# Patient Record
Sex: FEMALE | Race: WHITE | NOT HISPANIC OR LATINO | Employment: FULL TIME | URBAN - METROPOLITAN AREA
[De-identification: names, ages, dates, MRNs, and addresses within clinical notes are randomized per-mention and may not be internally consistent; named-entity substitution may affect disease eponyms.]

---

## 2017-01-07 ENCOUNTER — HOSPITAL ENCOUNTER (OUTPATIENT)
Dept: SLEEP MEDICINE | Facility: HOSPITAL | Age: 27
Discharge: HOME | End: 2017-01-07
Attending: PSYCHIATRY & NEUROLOGY | Admitting: PSYCHIATRY & NEUROLOGY

## 2017-03-31 ENCOUNTER — HOSPITAL ENCOUNTER (OUTPATIENT)
Dept: LAB | Facility: HOSPITAL | Age: 27
Discharge: HOME | End: 2017-03-31
Attending: PSYCHIATRY & NEUROLOGY | Admitting: PSYCHIATRY & NEUROLOGY

## 2017-03-31 LAB
ALBUMIN SERPL-MCNC: 3.7 G/DL (ref 3.5–4.8)
ALBUMIN/GLOB SERPL: 1.1 {RATIO} (ref 1–1.7)
ALP SERPL-CCNC: 82 IU/L (ref 32–91)
ALT SERPL-CCNC: 20 IU/L (ref 14–54)
ANION GAP SERPL CALC-SCNC: 12.6 MMOL/L (ref 10–20)
AST SERPL-CCNC: 19 IU/L (ref 15–41)
BASOPHILS # BLD AUTO: 0 10*3/UL (ref 0–0.2)
BASOPHILS NFR BLD AUTO: 1 % (ref 0–2)
BILIRUB SERPL-MCNC: 0.7 MG/DL (ref 0.3–1.2)
BILIRUB UR QL STRIP: NEGATIVE MG/DL
BUN SERPL-MCNC: 13 MG/DL (ref 8–20)
BUN/CREAT SERPL: 16.3 (ref 5.4–26.2)
CALCIUM SERPL-MCNC: 9.1 MG/DL (ref 8.9–10.3)
CASTS URNS QL MICRO: ABNORMAL /[LPF]
CHLORIDE SERPL-SCNC: 110 MMOL/L (ref 101–111)
CHOLEST SERPL-MCNC: 236 MG/DL
CHOLEST/HDLC SERPL: 6.5 {RATIO}
COLOR UR: ABNORMAL
CONV BACTERIA IN URINE MICRO: NEGATIVE
CONV CLARITY OF URINE: ABNORMAL
CONV CO2: 20 MMOL/L (ref 22–32)
CONV HYALINE CASTS IN URINE MICRO: 4 /[LPF] (ref 0–5)
CONV LDL CHOLESTEROL DIRECT: 180 MG/DL (ref 0–100)
CONV PROTEIN IN URINE BY AUTOMATED TEST STRIP: NEGATIVE MG/DL
CONV SMALL ROUND CELLS: ABNORMAL /[HPF]
CONV TOTAL PROTEIN: 7 G/DL (ref 6.1–7.9)
CONV UROBILINOGEN IN URINE BY AUTOMATED TEST STRIP: 0.2 MG/DL
CREAT UR-MCNC: 0.8 MG/DL (ref 0.4–1)
CULTURE INDICATED?: ABNORMAL
DIFFERENTIAL METHOD BLD: (no result)
EOSINOPHIL # BLD AUTO: 0.1 10*3/UL (ref 0–0.3)
EOSINOPHIL # BLD AUTO: 2 % (ref 0–3)
ERYTHROCYTE [DISTWIDTH] IN BLOOD BY AUTOMATED COUNT: 14.4 % (ref 11.5–14.5)
FOLATE SERPL-MCNC: 11 NG/ML (ref 5.9–24.8)
GLOBULIN UR ELPH-MCNC: 3.3 G/DL (ref 2.5–3.8)
GLUCOSE SERPL-MCNC: 110 MG/DL (ref 65–99)
GLUCOSE UR QL: NEGATIVE MG/DL
HCT VFR BLD AUTO: 36.6 % (ref 35–49)
HDLC SERPL-MCNC: 36 MG/DL
HGB BLD-MCNC: 12.1 G/DL (ref 12–15)
HGB UR QL STRIP: NEGATIVE
KETONES UR QL STRIP: NEGATIVE MG/DL
LDLC/HDLC SERPL: 4.9 {RATIO}
LEUKOCYTE ESTERASE UR QL STRIP: NEGATIVE
LIPID INTERPRETATION: ABNORMAL
LYMPHOCYTES # BLD AUTO: 1.5 10*3/UL (ref 0.8–4.8)
LYMPHOCYTES NFR BLD AUTO: 26 % (ref 18–42)
MCH RBC QN AUTO: 27.3 PG (ref 26–32)
MCHC RBC AUTO-ENTMCNC: 33.2 G/DL (ref 32–36)
MCV RBC AUTO: 82.2 FL (ref 80–94)
MONOCYTES # BLD AUTO: 0.4 10*3/UL (ref 0.1–1.3)
MONOCYTES NFR BLD AUTO: 6 % (ref 2–11)
NEUTROPHILS # BLD AUTO: 4 10*3/UL (ref 2.3–8.6)
NEUTROPHILS NFR BLD AUTO: 65 % (ref 50–75)
NITRITE UR QL STRIP: NEGATIVE
NRBC BLD AUTO-RTO: 0 /100{WBCS}
NRBC/RBC NFR BLD MANUAL: 0 10*3/UL
PH UR STRIP.AUTO: 5.5 [PH] (ref 4.5–8)
PLATELET # BLD AUTO: 316 10*3/UL (ref 150–450)
PMV BLD AUTO: 9.4 FL (ref 7.4–10.4)
POTASSIUM SERPL-SCNC: 3.6 MMOL/L (ref 3.6–5.1)
RBC # BLD AUTO: 4.45 10*6/UL (ref 4–5.4)
RBC #/AREA URNS HPF: 2 /[HPF] (ref 0–3)
SODIUM SERPL-SCNC: 139 MMOL/L (ref 136–144)
SP GR UR: 1.03 (ref 1–1.03)
SPERM URNS QL MICRO: ABNORMAL /[HPF]
SQUAMOUS SPT QL MICRO: 8 /[HPF] (ref 0–5)
T4 SERPL-MCNC: 7.56 UG/DL (ref 6.1–12.2)
TRIGL SERPL-MCNC: 95 MG/DL
TSH SERPL-ACNC: 3.27 UIU/ML (ref 0.34–5.6)
UNIDENT CRYS URNS QL MICRO: ABNORMAL /[HPF]
VIT B12 SERPL-MCNC: 270 PG/ML (ref 180–914)
VLDLC SERPL CALC-MCNC: 20.1 MG/DL
WBC # BLD AUTO: 6.1 10*3/UL (ref 4.5–11.5)
WBC #/AREA URNS HPF: 5 /[HPF] (ref 0–5)
YEAST SPEC QL WET PREP: ABNORMAL /[HPF]

## 2017-06-30 ENCOUNTER — HOSPITAL ENCOUNTER (OUTPATIENT)
Dept: OTHER | Facility: HOSPITAL | Age: 27
Setting detail: SPECIMEN
Discharge: HOME | End: 2017-06-30
Attending: NURSE PRACTITIONER | Admitting: NURSE PRACTITIONER

## 2017-06-30 LAB
ALBUMIN SERPL-MCNC: 3.9 G/DL (ref 3.5–4.8)
ALBUMIN/GLOB SERPL: 1.3 {RATIO} (ref 1–1.7)
ALP SERPL-CCNC: 97 IU/L (ref 32–91)
ALT SERPL-CCNC: 36 IU/L (ref 14–54)
ANION GAP SERPL CALC-SCNC: 13.7 MMOL/L (ref 10–20)
AST SERPL-CCNC: 29 IU/L (ref 15–41)
BILIRUB SERPL-MCNC: 0.4 MG/DL (ref 0.3–1.2)
BUN SERPL-MCNC: 15 MG/DL (ref 8–20)
BUN/CREAT SERPL: 15 (ref 5.4–26.2)
CALCIUM SERPL-MCNC: 9.4 MG/DL (ref 8.9–10.3)
CHLORIDE SERPL-SCNC: 106 MMOL/L (ref 101–111)
CHOLEST SERPL-MCNC: 208 MG/DL
CHOLEST/HDLC SERPL: 5 {RATIO}
CONV CO2: 22 MMOL/L (ref 22–32)
CONV LDL CHOLESTEROL DIRECT: 145 MG/DL (ref 0–100)
CONV TOTAL PROTEIN: 7 G/DL (ref 6.1–7.9)
CREAT UR-MCNC: 1 MG/DL (ref 0.4–1)
GLOBULIN UR ELPH-MCNC: 3.1 G/DL (ref 2.5–3.8)
GLUCOSE SERPL-MCNC: 86 MG/DL (ref 65–99)
HDLC SERPL-MCNC: 42 MG/DL
LDLC/HDLC SERPL: 3.5 {RATIO}
LIPID INTERPRETATION: ABNORMAL
POTASSIUM SERPL-SCNC: 4.7 MMOL/L (ref 3.6–5.1)
SODIUM SERPL-SCNC: 137 MMOL/L (ref 136–144)
TRIGL SERPL-MCNC: 101 MG/DL
TSH SERPL-ACNC: 2.77 UIU/ML (ref 0.34–5.6)
VLDLC SERPL CALC-MCNC: 21.4 MG/DL

## 2017-09-26 ENCOUNTER — CONVERSION ENCOUNTER (OUTPATIENT)
Dept: RHEUMATOLOGY | Facility: CLINIC | Age: 27
End: 2017-09-26

## 2017-09-26 LAB
ALBUMIN SERPL-MCNC: 3.9 G/DL (ref 3.6–5.1)
ALBUMIN/GLOB SERPL: ABNORMAL {RATIO} (ref 1–2.5)
ALP SERPL-CCNC: 104 UNITS/L (ref 33–115)
ALT SERPL-CCNC: 18 UNITS/L (ref 6–29)
AMORPH URATE CRY URNS QL MICRO: ABNORMAL
AST SERPL-CCNC: 11 UNITS/L (ref 10–30)
BASOPHILS # BLD AUTO: ABNORMAL 10*3/MM3 (ref 0–200)
BASOPHILS NFR BLD AUTO: 0.5 %
BILIRUB SERPL-MCNC: 0.2 MG/DL (ref 0.2–1.2)
BILIRUB UR QL STRIP: NEGATIVE
BUN SERPL-MCNC: 16 MG/DL (ref 7–25)
BUN/CREAT SERPL: ABNORMAL (ref 6–22)
CALCIUM SERPL-MCNC: 9.1 MG/DL (ref 8.6–10.2)
CHLORIDE SERPL-SCNC: 107 MMOL/L (ref 98–110)
CO2 CONTENT VENOUS: 25 MMOL/L (ref 20–31)
COLOR UR: ABNORMAL
CONV BACTERIA IN URINE MICRO: ABNORMAL /HPF
CONV COMMENT: 0.04
CONV HYALINE CASTS IN URINE MICRO: ABNORMAL
CONV NEUTROPHILS/100 LEUKOCYTES IN BODY FLUID BY MANUAL COUNT: 60.2 %
CONV PROTEIN IN URINE BY AUTOMATED TEST STRIP: NEGATIVE
CONV TOTAL PROTEIN: 6.9 G/DL (ref 6.1–8.1)
CREAT UR-MCNC: 0.89 MG/DL (ref 0.5–1.1)
CRP SERPL-MCNC: 0.89 MG/DL
EOSINOPHIL # BLD AUTO: 1.4 %
EOSINOPHIL # BLD AUTO: ABNORMAL 10*3/MM3 (ref 15–500)
ERYTHROCYTE [DISTWIDTH] IN BLOOD BY AUTOMATED COUNT: 13.4 % (ref 11–15)
ERYTHROCYTE [SEDIMENTATION RATE] IN BLOOD BY WESTERGREN METHOD: 22 MM/HR
GLOBULIN UR ELPH-MCNC: ABNORMAL G/DL (ref 1.9–3.7)
GLUCOSE SERPL-MCNC: 92 MG/DL (ref 65–99)
GLUCOSE UR QL: NEGATIVE G/DL
HBV CORE AB SER DONR QL IA: ABNORMAL
HBV CORE AB SER DONR QL IA: ABNORMAL
HBV SURFACE AB SER QL: ABNORMAL
HBV SURFACE AG SERPL QL IA: ABNORMAL
HCT VFR BLD AUTO: 35.7 % (ref 35–45)
HCV AB SER DONR QL: ABNORMAL
HGB BLD-MCNC: 12 G/DL (ref 11.7–15.5)
HGB UR QL STRIP: ABNORMAL
KETONES UR QL STRIP: NEGATIVE
LYMPHOCYTES # BLD AUTO: ABNORMAL 10*3/MM3 (ref 850–3900)
LYMPHOCYTES NFR BLD AUTO: 31.1 %
MCH RBC QN AUTO: 29.1 PG (ref 27–33)
MCHC RBC AUTO-ENTMCNC: ABNORMAL % (ref 32–36)
MCV RBC AUTO: 86.4 FL (ref 80–100)
MONOCYTES # BLD AUTO: ABNORMAL 10*3/MICROLITER (ref 200–950)
MONOCYTES NFR BLD AUTO: 6.8 %
NEUTROPHILS # BLD AUTO: ABNORMAL 10*3/MM3 (ref 1500–7800)
PH UR STRIP.AUTO: 7 [PH] (ref 5–8)
PLATELET # BLD AUTO: ABNORMAL 10*3/MM3 (ref 140–400)
PMV BLD AUTO: 10.8 FL (ref 7.5–12.5)
POTASSIUM SERPL-SCNC: 3.9 MMOL/L (ref 3.5–5.3)
RBC # BLD AUTO: ABNORMAL 10*6/MM3 (ref 3.8–5.1)
RBC #/AREA URNS HPF: ABNORMAL /[HPF]
SODIUM SERPL-SCNC: 139 MMOL/L (ref 135–146)
SP GR UR: 1.02 (ref 1–1.03)
SQUAMOUS #/AREA URNS HPF: ABNORMAL /HPF
WBC # BLD AUTO: ABNORMAL K/UL (ref 3.8–10.8)
WBC #/AREA URNS HPF: ABNORMAL CELLS/HPF

## 2017-12-26 ENCOUNTER — HOSPITAL ENCOUNTER (OUTPATIENT)
Dept: LAB | Facility: HOSPITAL | Age: 27
Discharge: HOME | End: 2017-12-26
Attending: INTERNAL MEDICINE | Admitting: INTERNAL MEDICINE

## 2017-12-26 LAB
ALBUMIN SERPL-MCNC: 3.5 G/DL (ref 3.5–4.8)
ALBUMIN/GLOB SERPL: 1.1 {RATIO} (ref 1–1.7)
ALP SERPL-CCNC: 81 IU/L (ref 32–91)
ALT SERPL-CCNC: 30 IU/L (ref 14–54)
ANION GAP SERPL CALC-SCNC: 14.6 MMOL/L (ref 10–20)
AST SERPL-CCNC: 20 IU/L (ref 15–41)
BASOPHILS # BLD AUTO: 0 10*3/UL (ref 0–0.2)
BASOPHILS NFR BLD AUTO: 1 % (ref 0–2)
BILIRUB SERPL-MCNC: 0.1 MG/DL (ref 0.3–1.2)
BILIRUB UR QL STRIP: ABNORMAL
BILIRUB UR QL STRIP: ABNORMAL MG/DL
BUN SERPL-MCNC: 12 MG/DL (ref 8–20)
BUN/CREAT SERPL: 13.3 (ref 5.4–26.2)
CALCIUM SERPL-MCNC: 9 MG/DL (ref 8.9–10.3)
CASTS URNS QL MICRO: ABNORMAL /[LPF]
CHLORIDE SERPL-SCNC: 105 MMOL/L (ref 101–111)
COLOR UR: ABNORMAL
CONV BACTERIA IN URINE MICRO: NEGATIVE
CONV CLARITY OF URINE: ABNORMAL
CONV CO2: 22 MMOL/L (ref 22–32)
CONV HYALINE CASTS IN URINE MICRO: 6 /[LPF] (ref 0–5)
CONV PROTEIN IN URINE BY AUTOMATED TEST STRIP: NEGATIVE MG/DL
CONV SMALL ROUND CELLS: ABNORMAL /[HPF]
CONV TOTAL PROTEIN: 6.6 G/DL (ref 6.1–7.9)
CONV UROBILINOGEN IN URINE BY AUTOMATED TEST STRIP: 0.2 MG/DL
CREAT UR-MCNC: 0.9 MG/DL (ref 0.4–1)
CRP SERPL-MCNC: 1.16 MG/DL (ref 0–0.7)
CULTURE INDICATED?: ABNORMAL
DIFFERENTIAL METHOD BLD: (no result)
EOSINOPHIL # BLD AUTO: 0.1 10*3/UL (ref 0–0.3)
EOSINOPHIL # BLD AUTO: 3 % (ref 0–3)
ERYTHROCYTE [DISTWIDTH] IN BLOOD BY AUTOMATED COUNT: 14.6 % (ref 11.5–14.5)
ERYTHROCYTE [SEDIMENTATION RATE] IN BLOOD BY WESTERGREN METHOD: 24 MM/HR (ref 0–20)
GLOBULIN UR ELPH-MCNC: 3.1 G/DL (ref 2.5–3.8)
GLUCOSE SERPL-MCNC: 105 MG/DL (ref 65–99)
GLUCOSE UR QL: NEGATIVE MG/DL
HCT VFR BLD AUTO: 36.3 % (ref 35–49)
HGB BLD-MCNC: 12.1 G/DL (ref 12–15)
HGB UR QL STRIP: NEGATIVE
KETONES UR QL STRIP: NEGATIVE MG/DL
LEUKOCYTE ESTERASE UR QL STRIP: NEGATIVE
LYMPHOCYTES # BLD AUTO: 1.7 10*3/UL (ref 0.8–4.8)
LYMPHOCYTES NFR BLD AUTO: 36 % (ref 18–42)
MCH RBC QN AUTO: 29.3 PG (ref 26–32)
MCHC RBC AUTO-ENTMCNC: 33.2 G/DL (ref 32–36)
MCV RBC AUTO: 88.3 FL (ref 80–94)
MONOCYTES # BLD AUTO: 0.3 10*3/UL (ref 0.1–1.3)
MONOCYTES NFR BLD AUTO: 7 % (ref 2–11)
NEUTROPHILS # BLD AUTO: 2.5 10*3/UL (ref 2.3–8.6)
NEUTROPHILS NFR BLD AUTO: 53 % (ref 50–75)
NITRITE UR QL STRIP: NEGATIVE
NRBC BLD AUTO-RTO: 0 /100{WBCS}
NRBC/RBC NFR BLD MANUAL: 0 10*3/UL
PH UR STRIP.AUTO: 5.5 [PH] (ref 4.5–8)
PLATELET # BLD AUTO: 329 10*3/UL (ref 150–450)
PMV BLD AUTO: 8.8 FL (ref 7.4–10.4)
POTASSIUM SERPL-SCNC: 3.6 MMOL/L (ref 3.6–5.1)
RBC # BLD AUTO: 4.12 10*6/UL (ref 4–5.4)
RBC #/AREA URNS HPF: 2 /[HPF] (ref 0–3)
SODIUM SERPL-SCNC: 138 MMOL/L (ref 136–144)
SP GR UR: 1.02 (ref 1–1.03)
SPERM URNS QL MICRO: ABNORMAL /[HPF]
SQUAMOUS SPT QL MICRO: 4 /[HPF] (ref 0–5)
UNIDENT CRYS URNS QL MICRO: ABNORMAL /[HPF]
WBC # BLD AUTO: 4.7 10*3/UL (ref 4.5–11.5)
WBC #/AREA URNS HPF: 1 /[HPF] (ref 0–5)
YEAST SPEC QL WET PREP: ABNORMAL /[HPF]

## 2018-04-03 ENCOUNTER — HOSPITAL ENCOUNTER (OUTPATIENT)
Dept: LAB | Facility: HOSPITAL | Age: 28
Discharge: HOME | End: 2018-04-03
Attending: NURSE PRACTITIONER | Admitting: NURSE PRACTITIONER

## 2018-04-03 LAB
ALBUMIN SERPL-MCNC: 3.7 G/DL (ref 3.5–4.8)
ALBUMIN/GLOB SERPL: 1.4 {RATIO} (ref 1–1.7)
ALP SERPL-CCNC: 71 IU/L (ref 32–91)
ALT SERPL-CCNC: 22 IU/L (ref 14–54)
ANION GAP SERPL CALC-SCNC: 9.8 MMOL/L (ref 10–20)
AST SERPL-CCNC: 20 IU/L (ref 15–41)
BASOPHILS # BLD AUTO: 0 10*3/UL (ref 0–0.2)
BASOPHILS NFR BLD AUTO: 1 % (ref 0–2)
BILIRUB SERPL-MCNC: 0.4 MG/DL (ref 0.3–1.2)
BUN SERPL-MCNC: 13 MG/DL (ref 8–20)
BUN/CREAT SERPL: 14.4 (ref 5.4–26.2)
CALCIUM SERPL-MCNC: 8.9 MG/DL (ref 8.9–10.3)
CHLORIDE SERPL-SCNC: 108 MMOL/L (ref 101–111)
CONV CO2: 23 MMOL/L (ref 22–32)
CONV TOTAL PROTEIN: 6.4 G/DL (ref 6.1–7.9)
CREAT UR-MCNC: 0.9 MG/DL (ref 0.4–1)
CRP SERPL-MCNC: 0.35 MG/DL (ref 0–0.7)
DIFFERENTIAL METHOD BLD: (no result)
EOSINOPHIL # BLD AUTO: 0.1 10*3/UL (ref 0–0.3)
EOSINOPHIL # BLD AUTO: 2 % (ref 0–3)
ERYTHROCYTE [DISTWIDTH] IN BLOOD BY AUTOMATED COUNT: 13.6 % (ref 11.5–14.5)
ERYTHROCYTE [SEDIMENTATION RATE] IN BLOOD BY WESTERGREN METHOD: 17 MM/HR (ref 0–20)
GLOBULIN UR ELPH-MCNC: 2.7 G/DL (ref 2.5–3.8)
GLUCOSE SERPL-MCNC: 106 MG/DL (ref 65–99)
HCT VFR BLD AUTO: 36.5 % (ref 35–49)
HGB BLD-MCNC: 12 G/DL (ref 12–15)
LYMPHOCYTES # BLD AUTO: 1 10*3/UL (ref 0.8–4.8)
LYMPHOCYTES NFR BLD AUTO: 25 % (ref 18–42)
MCH RBC QN AUTO: 29.8 PG (ref 26–32)
MCHC RBC AUTO-ENTMCNC: 32.8 G/DL (ref 32–36)
MCV RBC AUTO: 90.9 FL (ref 80–94)
MONOCYTES # BLD AUTO: 0.3 10*3/UL (ref 0.1–1.3)
MONOCYTES NFR BLD AUTO: 7 % (ref 2–11)
NEUTROPHILS # BLD AUTO: 2.7 10*3/UL (ref 2.3–8.6)
NEUTROPHILS NFR BLD AUTO: 65 % (ref 50–75)
NRBC BLD AUTO-RTO: 0 /100{WBCS}
NRBC/RBC NFR BLD MANUAL: 0 10*3/UL
PLATELET # BLD AUTO: 293 10*3/UL (ref 150–450)
PMV BLD AUTO: 8.9 FL (ref 7.4–10.4)
POTASSIUM SERPL-SCNC: 3.8 MMOL/L (ref 3.6–5.1)
RBC # BLD AUTO: 4.01 10*6/UL (ref 4–5.4)
SODIUM SERPL-SCNC: 137 MMOL/L (ref 136–144)
WBC # BLD AUTO: 4.1 10*3/UL (ref 4.5–11.5)

## 2018-06-26 ENCOUNTER — HOSPITAL ENCOUNTER (OUTPATIENT)
Dept: NUCLEAR MEDICINE | Facility: HOSPITAL | Age: 28
Discharge: HOME | End: 2018-06-26
Attending: NURSE PRACTITIONER | Admitting: NURSE PRACTITIONER

## 2018-07-02 ENCOUNTER — HOSPITAL ENCOUNTER (OUTPATIENT)
Dept: OTHER | Facility: HOSPITAL | Age: 28
Setting detail: SPECIMEN
Discharge: HOME | End: 2018-07-02
Attending: NURSE PRACTITIONER | Admitting: NURSE PRACTITIONER

## 2018-07-02 LAB
ALBUMIN SERPL-MCNC: 4 G/DL (ref 3.5–4.8)
ALBUMIN/GLOB SERPL: 1.3 {RATIO} (ref 1–1.7)
ALP SERPL-CCNC: 63 IU/L (ref 32–91)
ALT SERPL-CCNC: 24 IU/L (ref 14–54)
AMYLASE SERPL-CCNC: 52 U/L (ref 36–128)
ANION GAP SERPL CALC-SCNC: 10.5 MMOL/L (ref 10–20)
AST SERPL-CCNC: 22 IU/L (ref 15–41)
BASOPHILS # BLD AUTO: 0 10*3/UL (ref 0–0.2)
BASOPHILS NFR BLD AUTO: 1 % (ref 0–2)
BILIRUB SERPL-MCNC: 0.5 MG/DL (ref 0.3–1.2)
BUN SERPL-MCNC: 14 MG/DL (ref 8–20)
BUN/CREAT SERPL: 17.5 (ref 5.4–26.2)
CALCIUM SERPL-MCNC: 9.2 MG/DL (ref 8.9–10.3)
CHLORIDE SERPL-SCNC: 107 MMOL/L (ref 101–111)
CONV CO2: 22 MMOL/L (ref 22–32)
CONV TOTAL PROTEIN: 7.2 G/DL (ref 6.1–7.9)
CREAT UR-MCNC: 0.8 MG/DL (ref 0.4–1)
DIFFERENTIAL METHOD BLD: (no result)
EOSINOPHIL # BLD AUTO: 0.1 10*3/UL (ref 0–0.3)
EOSINOPHIL # BLD AUTO: 2 % (ref 0–3)
ERYTHROCYTE [DISTWIDTH] IN BLOOD BY AUTOMATED COUNT: 14.4 % (ref 11.5–14.5)
GLOBULIN UR ELPH-MCNC: 3.2 G/DL (ref 2.5–3.8)
GLUCOSE SERPL-MCNC: 113 MG/DL (ref 65–99)
HCT VFR BLD AUTO: 37.4 % (ref 35–49)
HGB BLD-MCNC: 12.2 G/DL (ref 12–15)
IRON SATN MFR SERPL: 15 % (ref 15–50)
IRON SERPL-MCNC: 62 UG/DL (ref 28–170)
LIPASE SERPL-CCNC: 30 U/L (ref 22–51)
LYMPHOCYTES # BLD AUTO: 1.3 10*3/UL (ref 0.8–4.8)
LYMPHOCYTES NFR BLD AUTO: 26 % (ref 18–42)
MCH RBC QN AUTO: 28.5 PG (ref 26–32)
MCHC RBC AUTO-ENTMCNC: 32.7 G/DL (ref 32–36)
MCV RBC AUTO: 87.2 FL (ref 80–94)
MONOCYTES # BLD AUTO: 0.3 10*3/UL (ref 0.1–1.3)
MONOCYTES NFR BLD AUTO: 7 % (ref 2–11)
NEUTROPHILS # BLD AUTO: 3.2 10*3/UL (ref 2.3–8.6)
NEUTROPHILS NFR BLD AUTO: 64 % (ref 50–75)
NRBC BLD AUTO-RTO: 0 /100{WBCS}
NRBC/RBC NFR BLD MANUAL: 0 10*3/UL
PLATELET # BLD AUTO: 304 10*3/UL (ref 150–450)
PMV BLD AUTO: 9.3 FL (ref 7.4–10.4)
POTASSIUM SERPL-SCNC: 3.5 MMOL/L (ref 3.6–5.1)
RBC # BLD AUTO: 4.29 10*6/UL (ref 4–5.4)
SODIUM SERPL-SCNC: 136 MMOL/L (ref 136–144)
TIBC SERPL-MCNC: 403 UG/DL (ref 228–428)
WBC # BLD AUTO: 5 10*3/UL (ref 4.5–11.5)

## 2018-08-10 ENCOUNTER — ON CAMPUS - OUTPATIENT (AMBULATORY)
Dept: URBAN - METROPOLITAN AREA HOSPITAL 2 | Facility: HOSPITAL | Age: 28
End: 2018-08-10
Payer: COMMERCIAL

## 2018-08-10 ENCOUNTER — OFFICE (AMBULATORY)
Dept: URBAN - METROPOLITAN AREA PATHOLOGY 4 | Facility: PATHOLOGY | Age: 28
End: 2018-08-10
Payer: COMMERCIAL

## 2018-08-10 VITALS
WEIGHT: 215 LBS | OXYGEN SATURATION: 99 % | DIASTOLIC BLOOD PRESSURE: 67 MMHG | DIASTOLIC BLOOD PRESSURE: 71 MMHG | SYSTOLIC BLOOD PRESSURE: 116 MMHG | HEART RATE: 92 BPM | OXYGEN SATURATION: 98 % | DIASTOLIC BLOOD PRESSURE: 85 MMHG | RESPIRATION RATE: 18 BRPM | SYSTOLIC BLOOD PRESSURE: 114 MMHG | TEMPERATURE: 97 F | HEART RATE: 81 BPM | SYSTOLIC BLOOD PRESSURE: 120 MMHG | HEART RATE: 78 BPM | SYSTOLIC BLOOD PRESSURE: 128 MMHG | DIASTOLIC BLOOD PRESSURE: 83 MMHG | HEIGHT: 62 IN | SYSTOLIC BLOOD PRESSURE: 148 MMHG | DIASTOLIC BLOOD PRESSURE: 86 MMHG | HEART RATE: 79 BPM

## 2018-08-10 DIAGNOSIS — K21.9 GASTRO-ESOPHAGEAL REFLUX DISEASE WITHOUT ESOPHAGITIS: ICD-10-CM

## 2018-08-10 DIAGNOSIS — R11.2 NAUSEA WITH VOMITING, UNSPECIFIED: ICD-10-CM

## 2018-08-10 DIAGNOSIS — K44.9 DIAPHRAGMATIC HERNIA WITHOUT OBSTRUCTION OR GANGRENE: ICD-10-CM

## 2018-08-10 DIAGNOSIS — K29.60 OTHER GASTRITIS WITHOUT BLEEDING: ICD-10-CM

## 2018-08-10 DIAGNOSIS — K58.9 IRRITABLE BOWEL SYNDROME WITHOUT DIARRHEA: ICD-10-CM

## 2018-08-10 DIAGNOSIS — K25.3 ACUTE GASTRIC ULCER WITHOUT HEMORRHAGE OR PERFORATION: ICD-10-CM

## 2018-08-10 LAB
GI HISTOLOGY: A. SELECT: (no result)
GI HISTOLOGY: PDF REPORT: (no result)

## 2018-08-10 PROCEDURE — 43239 EGD BIOPSY SINGLE/MULTIPLE: CPT | Performed by: INTERNAL MEDICINE

## 2018-08-10 PROCEDURE — 43450 DILATE ESOPHAGUS 1/MULT PASS: CPT | Performed by: INTERNAL MEDICINE

## 2018-08-10 PROCEDURE — 88305 TISSUE EXAM BY PATHOLOGIST: CPT | Performed by: INTERNAL MEDICINE

## 2018-08-10 PROCEDURE — 88342 IMHCHEM/IMCYTCHM 1ST ANTB: CPT | Performed by: INTERNAL MEDICINE

## 2018-08-10 RX ORDER — DICYCLOMINE HYDROCHLORIDE 20.6 MG/1
TABLET ORAL
Qty: 60 | Refills: 10 | Status: COMPLETED
Start: 2018-08-10 | End: 2018-11-06

## 2018-08-10 RX ORDER — OMEPRAZOLE 40 MG/1
40 CAPSULE, DELAYED RELEASE ORAL
Qty: 90 | Refills: 4 | Status: ACTIVE
Start: 2018-08-10

## 2018-08-16 ENCOUNTER — HOSPITAL ENCOUNTER (OUTPATIENT)
Dept: LAB | Facility: HOSPITAL | Age: 28
Discharge: HOME | End: 2018-08-16
Attending: NURSE PRACTITIONER | Admitting: NURSE PRACTITIONER

## 2018-08-16 LAB
ALBUMIN SERPL-MCNC: 3.8 G/DL (ref 3.5–4.8)
ALBUMIN/GLOB SERPL: 1.5 {RATIO} (ref 1–1.7)
ALP SERPL-CCNC: 58 IU/L (ref 32–91)
ALT SERPL-CCNC: 18 IU/L (ref 14–54)
ANION GAP SERPL CALC-SCNC: 11.9 MMOL/L (ref 10–20)
AST SERPL-CCNC: 15 IU/L (ref 15–41)
BASOPHILS # BLD AUTO: 0 10*3/UL (ref 0–0.2)
BASOPHILS NFR BLD AUTO: 1 % (ref 0–2)
BILIRUB SERPL-MCNC: 0.2 MG/DL (ref 0.3–1.2)
BUN SERPL-MCNC: 13 MG/DL (ref 8–20)
BUN/CREAT SERPL: 16.3 (ref 5.4–26.2)
CALCIUM SERPL-MCNC: 9.1 MG/DL (ref 8.9–10.3)
CHLORIDE SERPL-SCNC: 106 MMOL/L (ref 101–111)
CONV CO2: 21 MMOL/L (ref 22–32)
CONV TOTAL PROTEIN: 6.4 G/DL (ref 6.1–7.9)
CREAT UR-MCNC: 0.8 MG/DL (ref 0.4–1)
CRP SERPL-MCNC: 0.43 MG/DL (ref 0–0.7)
DIFFERENTIAL METHOD BLD: (no result)
EOSINOPHIL # BLD AUTO: 0 10*3/UL (ref 0–0.3)
EOSINOPHIL # BLD AUTO: 1 % (ref 0–3)
ERYTHROCYTE [DISTWIDTH] IN BLOOD BY AUTOMATED COUNT: 15 % (ref 11.5–14.5)
ERYTHROCYTE [SEDIMENTATION RATE] IN BLOOD BY WESTERGREN METHOD: 17 MM/HR (ref 0–20)
GLOBULIN UR ELPH-MCNC: 2.6 G/DL (ref 2.5–3.8)
GLUCOSE SERPL-MCNC: 93 MG/DL (ref 65–99)
HCT VFR BLD AUTO: 34.8 % (ref 35–49)
HGB BLD-MCNC: 11.4 G/DL (ref 12–15)
LYMPHOCYTES # BLD AUTO: 1 10*3/UL (ref 0.8–4.8)
LYMPHOCYTES NFR BLD AUTO: 20 % (ref 18–42)
MCH RBC QN AUTO: 29.1 PG (ref 26–32)
MCHC RBC AUTO-ENTMCNC: 32.8 G/DL (ref 32–36)
MCV RBC AUTO: 88.8 FL (ref 80–94)
MONOCYTES # BLD AUTO: 0.3 10*3/UL (ref 0.1–1.3)
MONOCYTES NFR BLD AUTO: 6 % (ref 2–11)
NEUTROPHILS # BLD AUTO: 3.7 10*3/UL (ref 2.3–8.6)
NEUTROPHILS NFR BLD AUTO: 72 % (ref 50–75)
NRBC BLD AUTO-RTO: 0 /100{WBCS}
NRBC/RBC NFR BLD MANUAL: 0 10*3/UL
PLATELET # BLD AUTO: 287 10*3/UL (ref 150–450)
PMV BLD AUTO: 9.2 FL (ref 7.4–10.4)
POTASSIUM SERPL-SCNC: 3.9 MMOL/L (ref 3.6–5.1)
RBC # BLD AUTO: 3.92 10*6/UL (ref 4–5.4)
SODIUM SERPL-SCNC: 135 MMOL/L (ref 136–144)
WBC # BLD AUTO: 5.1 10*3/UL (ref 4.5–11.5)

## 2018-09-24 ENCOUNTER — OFFICE (AMBULATORY)
Dept: URBAN - METROPOLITAN AREA CLINIC 64 | Facility: CLINIC | Age: 28
End: 2018-09-24
Payer: COMMERCIAL

## 2018-09-24 VITALS
HEART RATE: 60 BPM | DIASTOLIC BLOOD PRESSURE: 53 MMHG | HEIGHT: 62 IN | SYSTOLIC BLOOD PRESSURE: 102 MMHG | WEIGHT: 223 LBS

## 2018-09-24 DIAGNOSIS — R15.2 FECAL URGENCY: ICD-10-CM

## 2018-09-24 DIAGNOSIS — R10.11 RIGHT UPPER QUADRANT PAIN: ICD-10-CM

## 2018-09-24 DIAGNOSIS — K25.3 ACUTE GASTRIC ULCER WITHOUT HEMORRHAGE OR PERFORATION: ICD-10-CM

## 2018-09-24 DIAGNOSIS — R11.0 NAUSEA: ICD-10-CM

## 2018-09-24 PROCEDURE — 99213 OFFICE O/P EST LOW 20 MIN: CPT | Performed by: NURSE PRACTITIONER

## 2018-10-16 ENCOUNTER — ON CAMPUS - OUTPATIENT (AMBULATORY)
Dept: URBAN - METROPOLITAN AREA HOSPITAL 2 | Facility: HOSPITAL | Age: 28
End: 2018-10-16
Payer: COMMERCIAL

## 2018-10-16 VITALS
DIASTOLIC BLOOD PRESSURE: 86 MMHG | OXYGEN SATURATION: 96 % | SYSTOLIC BLOOD PRESSURE: 145 MMHG | OXYGEN SATURATION: 97 % | SYSTOLIC BLOOD PRESSURE: 129 MMHG | HEART RATE: 92 BPM | SYSTOLIC BLOOD PRESSURE: 123 MMHG | HEART RATE: 93 BPM | TEMPERATURE: 97.8 F | DIASTOLIC BLOOD PRESSURE: 97 MMHG | SYSTOLIC BLOOD PRESSURE: 141 MMHG | HEART RATE: 89 BPM | HEIGHT: 62 IN | HEART RATE: 100 BPM | RESPIRATION RATE: 18 BRPM | RESPIRATION RATE: 13 BRPM | RESPIRATION RATE: 16 BRPM | HEART RATE: 96 BPM | RESPIRATION RATE: 15 BRPM | OXYGEN SATURATION: 99 % | HEART RATE: 108 BPM | DIASTOLIC BLOOD PRESSURE: 83 MMHG | WEIGHT: 217 LBS | DIASTOLIC BLOOD PRESSURE: 56 MMHG | DIASTOLIC BLOOD PRESSURE: 89 MMHG | SYSTOLIC BLOOD PRESSURE: 121 MMHG | RESPIRATION RATE: 20 BRPM | SYSTOLIC BLOOD PRESSURE: 111 MMHG | OXYGEN SATURATION: 94 % | DIASTOLIC BLOOD PRESSURE: 71 MMHG | RESPIRATION RATE: 17 BRPM

## 2018-10-16 DIAGNOSIS — R11.2 NAUSEA WITH VOMITING, UNSPECIFIED: ICD-10-CM

## 2018-10-16 DIAGNOSIS — K21.9 GASTRO-ESOPHAGEAL REFLUX DISEASE WITHOUT ESOPHAGITIS: ICD-10-CM

## 2018-10-16 DIAGNOSIS — K44.9 DIAPHRAGMATIC HERNIA WITHOUT OBSTRUCTION OR GANGRENE: ICD-10-CM

## 2018-10-16 PROCEDURE — 43450 DILATE ESOPHAGUS 1/MULT PASS: CPT | Performed by: INTERNAL MEDICINE

## 2018-10-16 PROCEDURE — 43235 EGD DIAGNOSTIC BRUSH WASH: CPT | Performed by: INTERNAL MEDICINE

## 2018-11-06 ENCOUNTER — OFFICE (AMBULATORY)
Dept: URBAN - METROPOLITAN AREA CLINIC 64 | Facility: CLINIC | Age: 28
End: 2018-11-06
Payer: COMMERCIAL

## 2018-11-06 VITALS
HEART RATE: 80 BPM | SYSTOLIC BLOOD PRESSURE: 101 MMHG | WEIGHT: 221 LBS | HEIGHT: 62 IN | DIASTOLIC BLOOD PRESSURE: 66 MMHG

## 2018-11-06 DIAGNOSIS — R10.11 RIGHT UPPER QUADRANT PAIN: ICD-10-CM

## 2018-11-06 DIAGNOSIS — K58.2 MIXED IRRITABLE BOWEL SYNDROME: ICD-10-CM

## 2018-11-06 DIAGNOSIS — K21.9 GASTRO-ESOPHAGEAL REFLUX DISEASE WITHOUT ESOPHAGITIS: ICD-10-CM

## 2018-11-06 PROCEDURE — 99213 OFFICE O/P EST LOW 20 MIN: CPT | Performed by: NURSE PRACTITIONER

## 2018-12-04 ENCOUNTER — HOSPITAL ENCOUNTER (OUTPATIENT)
Dept: PREADMISSION TESTING | Facility: HOSPITAL | Age: 28
Discharge: HOME | End: 2018-12-04
Attending: SURGERY | Admitting: SURGERY

## 2018-12-04 LAB
ABO + RH BLD: NORMAL
ANION GAP SERPL CALC-SCNC: 9.5 MMOL/L (ref 10–20)
ARMBAND: NORMAL
BASOPHILS # BLD AUTO: 0 10*3/UL (ref 0–0.2)
BASOPHILS NFR BLD AUTO: 1 % (ref 0–2)
BLD COMPONENT TYPE: NORMAL
BLD GP AB SCN SERPL QL: NEGATIVE
BUN SERPL-MCNC: 11 MG/DL (ref 8–20)
BUN/CREAT SERPL: 13.8 (ref 5.4–26.2)
CALCIUM SERPL-MCNC: 8.7 MG/DL (ref 8.9–10.3)
CHLORIDE SERPL-SCNC: 107 MMOL/L (ref 101–111)
CONV CO2: 22 MMOL/L (ref 22–32)
CREAT UR-MCNC: 0.8 MG/DL (ref 0.4–1)
CROSSMATCH EXPIRATION: NORMAL
DIFFERENTIAL METHOD BLD: (no result)
EOSINOPHIL # BLD AUTO: 0.1 10*3/UL (ref 0–0.3)
EOSINOPHIL # BLD AUTO: 1 % (ref 0–3)
ERYTHROCYTE [DISTWIDTH] IN BLOOD BY AUTOMATED COUNT: 13.5 % (ref 11.5–14.5)
GLUCOSE SERPL-MCNC: 98 MG/DL (ref 65–99)
HCT VFR BLD AUTO: 34.4 % (ref 35–49)
HGB BLD-MCNC: 11.3 G/DL (ref 12–15)
LYMPHOCYTES # BLD AUTO: 1.1 10*3/UL (ref 0.8–4.8)
LYMPHOCYTES NFR BLD AUTO: 26 % (ref 18–42)
MCH RBC QN AUTO: 28.7 PG (ref 26–32)
MCHC RBC AUTO-ENTMCNC: 32.8 G/DL (ref 32–36)
MCV RBC AUTO: 87.5 FL (ref 80–94)
MICRO REPORT STATUS: NORMAL
MONOCYTES # BLD AUTO: 0.3 10*3/UL (ref 0.1–1.3)
MONOCYTES NFR BLD AUTO: 8 % (ref 2–11)
NEUTROPHILS # BLD AUTO: 2.7 10*3/UL (ref 2.3–8.6)
NEUTROPHILS NFR BLD AUTO: 64 % (ref 50–75)
NRBC BLD AUTO-RTO: 0 /100{WBCS}
NRBC/RBC NFR BLD MANUAL: 0 10*3/UL
PLATELET # BLD AUTO: 302 10*3/UL (ref 150–450)
PMV BLD AUTO: 8.8 FL (ref 7.4–10.4)
POTASSIUM SERPL-SCNC: 3.5 MMOL/L (ref 3.6–5.1)
RBC # BLD AUTO: 3.93 10*6/UL (ref 4–5.4)
SODIUM SERPL-SCNC: 135 MMOL/L (ref 136–144)
WBC # BLD AUTO: 4.2 10*3/UL (ref 4.5–11.5)

## 2019-04-03 ENCOUNTER — HOSPITAL ENCOUNTER (OUTPATIENT)
Dept: LAB | Facility: HOSPITAL | Age: 29
Discharge: HOME | End: 2019-04-03
Attending: NURSE PRACTITIONER | Admitting: NURSE PRACTITIONER

## 2019-04-03 LAB
ALBUMIN SERPL-MCNC: 3.7 G/DL (ref 3.5–4.8)
ALBUMIN/GLOB SERPL: 1.4 {RATIO} (ref 1–1.7)
ALP SERPL-CCNC: 67 IU/L (ref 32–91)
ALT SERPL-CCNC: 16 IU/L (ref 14–54)
ANION GAP SERPL CALC-SCNC: 11.6 MMOL/L (ref 10–20)
AST SERPL-CCNC: 17 IU/L (ref 15–41)
BASOPHILS # BLD AUTO: 0 10*3/UL (ref 0–0.2)
BASOPHILS NFR BLD AUTO: 1 % (ref 0–2)
BILIRUB SERPL-MCNC: 0.3 MG/DL (ref 0.3–1.2)
BUN SERPL-MCNC: 11 MG/DL (ref 8–20)
BUN/CREAT SERPL: 12.2 (ref 5.4–26.2)
CALCIUM SERPL-MCNC: 9.1 MG/DL (ref 8.9–10.3)
CHLORIDE SERPL-SCNC: 109 MMOL/L (ref 101–111)
CONV CO2: 22 MMOL/L (ref 22–32)
CONV TOTAL PROTEIN: 6.3 G/DL (ref 6.1–7.9)
CREAT UR-MCNC: 0.9 MG/DL (ref 0.4–1)
CRP SERPL-MCNC: 0.16 MG/DL (ref 0–0.7)
DIFFERENTIAL METHOD BLD: (no result)
EOSINOPHIL # BLD AUTO: 0.1 10*3/UL (ref 0–0.3)
EOSINOPHIL # BLD AUTO: 1 % (ref 0–3)
ERYTHROCYTE [DISTWIDTH] IN BLOOD BY AUTOMATED COUNT: 15.8 % (ref 11.5–14.5)
ERYTHROCYTE [SEDIMENTATION RATE] IN BLOOD BY WESTERGREN METHOD: 12 MM/HR (ref 0–20)
GLOBULIN UR ELPH-MCNC: 2.6 G/DL (ref 2.5–3.8)
GLUCOSE SERPL-MCNC: 99 MG/DL (ref 65–99)
HCT VFR BLD AUTO: 36.7 % (ref 35–49)
HGB BLD-MCNC: 11.9 G/DL (ref 12–15)
LYMPHOCYTES # BLD AUTO: 1.3 10*3/UL (ref 0.8–4.8)
LYMPHOCYTES NFR BLD AUTO: 31 % (ref 18–42)
MCH RBC QN AUTO: 28.3 PG (ref 26–32)
MCHC RBC AUTO-ENTMCNC: 32.5 G/DL (ref 32–36)
MCV RBC AUTO: 86.9 FL (ref 80–94)
MONOCYTES # BLD AUTO: 0.3 10*3/UL (ref 0.1–1.3)
MONOCYTES NFR BLD AUTO: 8 % (ref 2–11)
NEUTROPHILS # BLD AUTO: 2.4 10*3/UL (ref 2.3–8.6)
NEUTROPHILS NFR BLD AUTO: 59 % (ref 50–75)
NRBC BLD AUTO-RTO: 0 /100{WBCS}
NRBC/RBC NFR BLD MANUAL: 0 10*3/UL
PLATELET # BLD AUTO: 277 10*3/UL (ref 150–450)
PMV BLD AUTO: 8.9 FL (ref 7.4–10.4)
POTASSIUM SERPL-SCNC: 3.6 MMOL/L (ref 3.6–5.1)
RBC # BLD AUTO: 4.22 10*6/UL (ref 4–5.4)
SODIUM SERPL-SCNC: 139 MMOL/L (ref 136–144)
WBC # BLD AUTO: 4.1 10*3/UL (ref 4.5–11.5)

## 2019-08-06 ENCOUNTER — OFFICE VISIT (OUTPATIENT)
Dept: SURGERY | Facility: CLINIC | Age: 29
End: 2019-08-06

## 2019-08-06 VITALS
OXYGEN SATURATION: 98 % | HEART RATE: 70 BPM | SYSTOLIC BLOOD PRESSURE: 126 MMHG | HEIGHT: 62 IN | WEIGHT: 196 LBS | BODY MASS INDEX: 36.07 KG/M2 | TEMPERATURE: 98.1 F | DIASTOLIC BLOOD PRESSURE: 84 MMHG

## 2019-08-06 DIAGNOSIS — K21.9 GASTROESOPHAGEAL REFLUX DISEASE WITHOUT ESOPHAGITIS: Primary | ICD-10-CM

## 2019-08-06 PROBLEM — M35.00 SJOGREN'S SYNDROME: Status: ACTIVE | Noted: 2017-10-25

## 2019-08-06 PROBLEM — J45.909 ASTHMA: Status: ACTIVE | Noted: 2019-08-06

## 2019-08-06 PROBLEM — M13.80 SERONEGATIVE ARTHRITIS: Status: ACTIVE | Noted: 2017-10-25

## 2019-08-06 PROBLEM — M25.50 JOINT PAIN: Status: ACTIVE | Noted: 2017-09-25

## 2019-08-06 PROCEDURE — 99212 OFFICE O/P EST SF 10 MIN: CPT | Performed by: SURGERY

## 2019-08-06 RX ORDER — SUMATRIPTAN 100 MG/1
TABLET, FILM COATED ORAL
COMMUNITY
Start: 2017-06-22 | End: 2021-11-04

## 2019-08-06 RX ORDER — FEXOFENADINE HCL 180 MG/1
TABLET ORAL EVERY 24 HOURS
COMMUNITY
Start: 2017-02-15

## 2019-08-06 RX ORDER — SPIRONOLACTONE 50 MG/1
TABLET, FILM COATED ORAL
COMMUNITY
Start: 2017-06-30 | End: 2020-11-02 | Stop reason: DRUGHIGH

## 2019-08-06 RX ORDER — BISOPROLOL FUMARATE AND HYDROCHLOROTHIAZIDE 10; 6.25 MG/1; MG/1
TABLET ORAL
Qty: 90 TABLET | Refills: 0 | Status: SHIPPED | OUTPATIENT
Start: 2019-08-06 | End: 2019-09-30 | Stop reason: SDUPTHER

## 2019-08-06 RX ORDER — TOPIRAMATE 50 MG/1
TABLET, FILM COATED ORAL
COMMUNITY
Start: 2016-12-13 | End: 2019-11-09 | Stop reason: SDUPTHER

## 2019-08-06 RX ORDER — BUPROPION HYDROCHLORIDE 150 MG/1
TABLET ORAL
COMMUNITY
Start: 2017-06-30

## 2019-08-06 RX ORDER — LURASIDONE HYDROCHLORIDE 60 MG/1
TABLET, FILM COATED ORAL EVERY 24 HOURS
COMMUNITY
Start: 2017-07-27 | End: 2020-11-02 | Stop reason: DRUGHIGH

## 2019-08-06 RX ORDER — AZELASTINE HYDROCHLORIDE 0.5 MG/ML
SOLUTION/ DROPS OPHTHALMIC
COMMUNITY
Start: 2018-04-03

## 2019-08-06 RX ORDER — SULFASALAZINE 500 MG/1
TABLET ORAL EVERY 12 HOURS
COMMUNITY
Start: 2017-12-23 | End: 2019-09-03 | Stop reason: SDUPTHER

## 2019-08-06 RX ORDER — HYDROXYCHLOROQUINE SULFATE 200 MG/1
TABLET, FILM COATED ORAL EVERY 12 HOURS
COMMUNITY
Start: 2017-12-23 | End: 2019-08-24 | Stop reason: SDUPTHER

## 2019-08-06 RX ORDER — DICYCLOMINE HCL 20 MG
10 TABLET ORAL 2 TIMES DAILY
COMMUNITY
Start: 2018-08-16 | End: 2020-11-02 | Stop reason: DRUGHIGH

## 2019-08-06 NOTE — PROGRESS NOTES
Subjective   Sunita Matamoros is a 29 y.o. female.     History of present illness  Sunita is seen in the office today for six-month follow-up from her lap hiatal hernia repair with transoral fundoplication and cholecystectomy.  She states that she is doing well.  No difficulty eating.  She states she is gaining weight is eating too much. She  has no heartburn no indigestion.    Past Medical History:   Diagnosis Date   • Hypertension    • Leaky heart valve    • Migraines    • PCOS (polycystic ovarian syndrome)    • Rheumatoid arteritis        Past Surgical History:   Procedure Laterality Date   • HIATAL HERNIA REPAIR  12/2018    with transoral fundoplication   • LAPAROSCOPIC CHOLECYSTECTOMY  12/2018       Outpatient Encounter Medications as of 8/6/2019   Medication Sig Dispense Refill   • azelastine (OPTIVAR) 0.05 % ophthalmic solution AZELASTINE HCL 0.05 % SOLN     • buPROPion XL (WELLBUTRIN XL) 150 MG 24 hr tablet BUPROPION HCL ER (XL) 150 MG XR24H-TAB     • dicyclomine (BENTYL) 20 MG tablet Take 10 mg by mouth 2 (Two) Times a Day.     • fexofenadine (ALLEGRA) 180 MG tablet Daily.     • hydroxychloroquine (PLAQUENIL) 200 MG tablet Every 12 (Twelve) Hours.     • lurasidone HCl (LATUDA) 60 MG tablet tablet Daily.     • metFORMIN (GLUCOPHAGE) 500 MG tablet Take 500 mg by mouth 2 (Two) Times a Day.     • spironolactone (ALDACTONE) 50 MG tablet SPIRONOLACTONE 50 MG TABS     • sulfaSALAzine (AZULFIDINE) 500 MG tablet Every 12 (Twelve) Hours.     • SUMAtriptan (IMITREX) 100 MG tablet IMITREX 100 MG TABS     • topiramate (TOPAMAX) 50 MG tablet TOPAMAX 50 MG TABS     • bisoprolol-hydrochlorothiazide (ZIAC) 10-6.25 MG per tablet TAKE ONE TABLET BY MOUTH DAILY 90 tablet 0     No facility-administered encounter medications on file as of 8/6/2019.        Allergies   Allergen Reactions   • Amoxicillin Hives   • Penicillin G Hives   • Carafate [Sucralfate] Hives       History reviewed. No pertinent family history.    Social  History     Socioeconomic History   • Marital status: Single     Spouse name: Not on file   • Number of children: Not on file   • Years of education: Not on file   • Highest education level: Not on file   Tobacco Use   • Smoking status: Never Smoker   • Smokeless tobacco: Never Used   Substance and Sexual Activity   • Alcohol use: Yes     Comment: occ   • Drug use: No   • Sexual activity: Defer       The following portions of the patient's history were reviewed and updated as appropriate: allergies, current medications, past family history, past medical history, past social history, past surgical history and problem list.    Objective       Assessment/Plan   There are no diagnoses linked to this encounter.    Review of systems is unchanged from previous visit.  I would refer you to it for further details.    Impression doing very well.  Off medicines no heartburn.    Plan: Recheck in the office in 2 years           Vincent Mccray DO  8/6/2019  8:51 AM

## 2019-08-26 RX ORDER — HYDROXYCHLOROQUINE SULFATE 200 MG/1
TABLET, FILM COATED ORAL
Qty: 180 TABLET | Refills: 4 | Status: SHIPPED | OUTPATIENT
Start: 2019-08-26 | End: 2019-09-03 | Stop reason: SDUPTHER

## 2019-09-03 ENCOUNTER — APPOINTMENT (OUTPATIENT)
Dept: LAB | Facility: HOSPITAL | Age: 29
End: 2019-09-03

## 2019-09-03 ENCOUNTER — OFFICE VISIT (OUTPATIENT)
Dept: RHEUMATOLOGY | Facility: CLINIC | Age: 29
End: 2019-09-03

## 2019-09-03 VITALS
HEART RATE: 79 BPM | DIASTOLIC BLOOD PRESSURE: 72 MMHG | SYSTOLIC BLOOD PRESSURE: 103 MMHG | HEIGHT: 62 IN | BODY MASS INDEX: 36.44 KG/M2 | WEIGHT: 198 LBS

## 2019-09-03 DIAGNOSIS — M19.90 INFLAMMATORY ARTHRITIS: ICD-10-CM

## 2019-09-03 DIAGNOSIS — Z79.899 LONG-TERM USE OF HIGH-RISK MEDICATION: Primary | ICD-10-CM

## 2019-09-03 LAB
ALBUMIN SERPL-MCNC: 4.1 G/DL (ref 3.5–4.8)
ALBUMIN/GLOB SERPL: 1.5 G/DL (ref 1–1.7)
ALP SERPL-CCNC: 78 U/L (ref 32–91)
ALT SERPL W P-5'-P-CCNC: 22 U/L (ref 14–54)
ANION GAP SERPL CALCULATED.3IONS-SCNC: 14.8 MMOL/L (ref 5–15)
AST SERPL-CCNC: 21 U/L (ref 15–41)
BASOPHILS # BLD AUTO: 0 10*3/MM3 (ref 0–0.2)
BASOPHILS NFR BLD AUTO: 0.6 % (ref 0–1.5)
BILIRUB SERPL-MCNC: 0.7 MG/DL (ref 0.3–1.2)
BILIRUB UR QL STRIP: NEGATIVE
BUN BLD-MCNC: 10 MG/DL (ref 8–20)
BUN/CREAT SERPL: 11.1 (ref 5.4–26.2)
CALCIUM SPEC-SCNC: 9.2 MG/DL (ref 8.9–10.3)
CHLORIDE SERPL-SCNC: 103 MMOL/L (ref 101–111)
CLARITY UR: CLEAR
CO2 SERPL-SCNC: 23 MMOL/L (ref 22–32)
COLOR UR: ABNORMAL
CREAT BLD-MCNC: 0.9 MG/DL (ref 0.4–1)
CRP SERPL-MCNC: 0.1 MG/DL (ref 0–0.7)
DEPRECATED RDW RBC AUTO: 42 FL (ref 37–54)
EOSINOPHIL # BLD AUTO: 0 10*3/MM3 (ref 0–0.4)
EOSINOPHIL NFR BLD AUTO: 0.8 % (ref 0.3–6.2)
ERYTHROCYTE [DISTWIDTH] IN BLOOD BY AUTOMATED COUNT: 13.1 % (ref 12.3–15.4)
ERYTHROCYTE [SEDIMENTATION RATE] IN BLOOD: 12 MM/HR (ref 0–20)
GFR SERPL CREATININE-BSD FRML MDRD: 74 ML/MIN/1.73
GLOBULIN UR ELPH-MCNC: 2.8 GM/DL (ref 2.5–3.8)
GLUCOSE BLD-MCNC: 94 MG/DL (ref 65–99)
GLUCOSE UR STRIP-MCNC: NEGATIVE MG/DL
HCT VFR BLD AUTO: 38.7 % (ref 34–46.6)
HGB BLD-MCNC: 12.7 G/DL (ref 12–15.9)
HGB UR QL STRIP.AUTO: NEGATIVE
KETONES UR QL STRIP: NEGATIVE
LEUKOCYTE ESTERASE UR QL STRIP.AUTO: NEGATIVE
LYMPHOCYTES # BLD AUTO: 1.1 10*3/MM3 (ref 0.7–3.1)
LYMPHOCYTES NFR BLD AUTO: 21.6 % (ref 19.6–45.3)
MCH RBC QN AUTO: 30.2 PG (ref 26.6–33)
MCHC RBC AUTO-ENTMCNC: 32.9 G/DL (ref 31.5–35.7)
MCV RBC AUTO: 91.6 FL (ref 79–97)
MONOCYTES # BLD AUTO: 0.3 10*3/MM3 (ref 0.1–0.9)
MONOCYTES NFR BLD AUTO: 6.5 % (ref 5–12)
NEUTROPHILS # BLD AUTO: 3.6 10*3/MM3 (ref 1.7–7)
NEUTROPHILS NFR BLD AUTO: 70.5 % (ref 42.7–76)
NITRITE UR QL STRIP: NEGATIVE
PH UR STRIP.AUTO: 6.5 [PH] (ref 5–8)
PLATELET # BLD AUTO: 275 10*3/MM3 (ref 140–450)
PMV BLD AUTO: 9.2 FL (ref 6–12)
POTASSIUM BLD-SCNC: 3.8 MMOL/L (ref 3.6–5.1)
PROT SERPL-MCNC: 6.9 G/DL (ref 6.1–7.9)
PROT UR QL STRIP: NEGATIVE
RBC # BLD AUTO: 4.22 10*6/MM3 (ref 3.77–5.28)
SODIUM BLD-SCNC: 137 MMOL/L (ref 136–144)
SP GR UR STRIP: 1.02 (ref 1–1.03)
UROBILINOGEN UR QL STRIP: ABNORMAL
WBC NRBC COR # BLD: 5.1 10*3/MM3 (ref 3.4–10.8)

## 2019-09-03 PROCEDURE — 81003 URINALYSIS AUTO W/O SCOPE: CPT | Performed by: INTERNAL MEDICINE

## 2019-09-03 PROCEDURE — 36415 COLL VENOUS BLD VENIPUNCTURE: CPT | Performed by: INTERNAL MEDICINE

## 2019-09-03 PROCEDURE — 99214 OFFICE O/P EST MOD 30 MIN: CPT | Performed by: INTERNAL MEDICINE

## 2019-09-03 PROCEDURE — 85027 COMPLETE CBC AUTOMATED: CPT | Performed by: INTERNAL MEDICINE

## 2019-09-03 PROCEDURE — 86140 C-REACTIVE PROTEIN: CPT | Performed by: INTERNAL MEDICINE

## 2019-09-03 PROCEDURE — 90471 IMMUNIZATION ADMIN: CPT | Performed by: INTERNAL MEDICINE

## 2019-09-03 PROCEDURE — 85652 RBC SED RATE AUTOMATED: CPT | Performed by: INTERNAL MEDICINE

## 2019-09-03 PROCEDURE — 80053 COMPREHEN METABOLIC PANEL: CPT | Performed by: INTERNAL MEDICINE

## 2019-09-03 PROCEDURE — 90670 PCV13 VACCINE IM: CPT | Performed by: INTERNAL MEDICINE

## 2019-09-03 RX ORDER — SULFASALAZINE 500 MG/1
500 TABLET ORAL 2 TIMES DAILY
Qty: 60 TABLET | Refills: 3 | Status: SHIPPED | OUTPATIENT
Start: 2019-09-03 | End: 2019-10-14 | Stop reason: SDUPTHER

## 2019-09-03 RX ORDER — ROSUVASTATIN CALCIUM 20 MG/1
20 TABLET, COATED ORAL DAILY
COMMUNITY
End: 2020-11-02 | Stop reason: DRUGHIGH

## 2019-09-03 RX ORDER — HYDROXYCHLOROQUINE SULFATE 200 MG/1
200 TABLET, FILM COATED ORAL 2 TIMES DAILY
Qty: 180 TABLET | Refills: 3 | Status: SHIPPED | OUTPATIENT
Start: 2019-09-03 | End: 2020-02-27 | Stop reason: SDUPTHER

## 2019-09-03 NOTE — PROGRESS NOTES
The patient is a 37-year-old female with seronegative inflammatory arthritis currently on sulfasalazine 1000 mg twice a day, Plaquenil 200 mg p.o. twice daily.  She is compliant with the treatment denies side effects.  She says that she can really tell a difference after she started taking those 2 medications.  Overall she has been doing well, at times she reports pain that is rated 6 out of 10, she has 1 to 2 minutes of morning stiffness, she has not noticed major joint swelling, her motions are in her hands.    No laboratories for review today.    Review of systems negative for fever chills, no changes in her weight, no dry eyes or dry mouth, denies nausea, vomiting, diarrhea constipation, no chest pain or shortness of breath.  All other systems reviewed and they were negative.    Social family history reviewed and unchanged.    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   • Pulmonary edema 09/22/2016   • Seronegative arthritis 10/25/2017   • Sinus tachycardia 02/03/2015   • Sjogren's syndrome (CMS/HCC) 10/25/2017   • Syncope 09/22/2016   • Vitamin B12 deficiency 05/08/2014   • GERD (gastroesophageal reflux disease) 08/06/2019     Resolved Ambulatory Problems     Diagnosis Date Noted   • No Resolved Ambulatory Problems     Past Medical History:   Diagnosis Date   • Hypertension    • Leaky heart valve    • Migraines    • PCOS (polycystic ovarian syndrome)    • Rheumatoid arteritis      Physical examination:  Vitals:    09/03/19 0813   BP: 103/72    Pulse: 79     GENERAL: Well-developed, well-nourished in no acute distress. Alert and oriented x3.  HEENT: Normocephalic, atraumatic. Pupils are equal, round, and reactive to light. Extraocular muscles are intact. Mucous membranes are pink and moist. Nostrils are clear.   NECK: Supple without lymphadenopathy.  LUNGS: Clear to auscultation bilaterally.  HEART: Regular rate and rhythm without murmur, rub or gallop.  CHEST: Respirations easy and unlabored.  EXTREMITIES: No cyanosis, edema or clubbing.  SKIN: Warm, dry and intact.  MSK: Mild tenderness in 4 PIP joints, 3 MCP joints.  No signs of synovitis.    Assessment:  1.Seronegative inflammatory arthritis on sulfasalazine and Plaquenil.  Compliant with the treatment no side effects.  Physical exam still shows tenderness in the few joints, no signs of synovitis.  Plan to proceed with a Vectra DA.  We will consider proceeding with an ultrasound of the hands if needed.  For now she will continue with the same treatment with no changes except for addition of a compounded cream to be applied twice daily in affected joints.    2. High-risk medications.  The patient is on medications right breast inflammatory arthritis.  I am monitoring for side effects, I will update her vaccinations, she will receive the Prevnar today.    Plan:  Laboratories to be done today  Compounded cream to apply twice daily  Continue with sulfasalazine 1000 mg twice a day  Continue Plaquenil 200 mg 1 tab p.o. twice daily  Laboratories to be done today, lab to see them prior to her next appointment, RTC in 4 months or sooner if needed.

## 2019-09-30 ENCOUNTER — OFFICE VISIT (OUTPATIENT)
Dept: CARDIOLOGY | Facility: CLINIC | Age: 29
End: 2019-09-30

## 2019-09-30 VITALS
WEIGHT: 197 LBS | DIASTOLIC BLOOD PRESSURE: 75 MMHG | BODY MASS INDEX: 36.25 KG/M2 | HEIGHT: 62 IN | SYSTOLIC BLOOD PRESSURE: 109 MMHG | HEART RATE: 85 BPM | OXYGEN SATURATION: 100 %

## 2019-09-30 DIAGNOSIS — I10 ESSENTIAL HYPERTENSION: Primary | ICD-10-CM

## 2019-09-30 PROCEDURE — 99212 OFFICE O/P EST SF 10 MIN: CPT | Performed by: INTERNAL MEDICINE

## 2019-09-30 PROCEDURE — 93000 ELECTROCARDIOGRAM COMPLETE: CPT | Performed by: INTERNAL MEDICINE

## 2019-09-30 RX ORDER — BISOPROLOL FUMARATE AND HYDROCHLOROTHIAZIDE 10; 6.25 MG/1; MG/1
1 TABLET ORAL DAILY
Qty: 90 TABLET | Refills: 3 | Status: SHIPPED | OUTPATIENT
Start: 2019-09-30 | End: 2020-10-21

## 2019-09-30 NOTE — PROGRESS NOTES
Subjective:     Encounter Date:09/30/2019      Patient ID: Sunita Matamoros is a 29 y.o. female.    Chief Complaint:   History of Present Illness See below      Past Medical History:  Past Medical History:   Diagnosis Date   • Hypertension    • Leaky heart valve    • Migraines    • PCOS (polycystic ovarian syndrome)    • Rheumatoid arteritis      Past Surgical History:  Past Surgical History:   Procedure Laterality Date   • HIATAL HERNIA REPAIR  12/2018    with transoral fundoplication   • LAPAROSCOPIC CHOLECYSTECTOMY  12/2018      Allergies:  Allergies   Allergen Reactions   • Amoxicillin Hives   • Penicillin G Hives   • Carafate [Sucralfate] Hives     Home Meds:  Current Meds:     Current Outpatient Medications:   •  azelastine (OPTIVAR) 0.05 % ophthalmic solution, AZELASTINE HCL 0.05 % SOLN, Disp: , Rfl:   •  bisoprolol-hydrochlorothiazide (ZIAC) 10-6.25 MG per tablet, TAKE ONE TABLET BY MOUTH DAILY, Disp: 90 tablet, Rfl: 0  •  buPROPion XL (WELLBUTRIN XL) 150 MG 24 hr tablet, BUPROPION HCL ER (XL) 150 MG XR24H-TAB, Disp: , Rfl:   •  dicyclomine (BENTYL) 20 MG tablet, Take 10 mg by mouth 2 (Two) Times a Day., Disp: , Rfl:   •  fexofenadine (ALLEGRA) 180 MG tablet, Daily., Disp: , Rfl:   •  hydroxychloroquine (PLAQUENIL) 200 MG tablet, Take 1 tablet by mouth 2 (Two) Times a Day., Disp: 180 tablet, Rfl: 3  •  lurasidone HCl (LATUDA) 60 MG tablet tablet, Daily., Disp: , Rfl:   •  metFORMIN (GLUCOPHAGE) 500 MG tablet, Take 500 mg by mouth 2 (Two) Times a Day., Disp: , Rfl:   •  rosuvastatin (CRESTOR) 20 MG tablet, Take 20 mg by mouth Daily., Disp: , Rfl:   •  spironolactone (ALDACTONE) 50 MG tablet, SPIRONOLACTONE 50 MG TABS, Disp: , Rfl:   •  sulfaSALAzine (AZULFIDINE) 500 MG tablet, Take 1 tablet by mouth 2 (Two) Times a Day., Disp: 60 tablet, Rfl: 3  •  SUMAtriptan (IMITREX) 100 MG tablet, IMITREX 100 MG TABS, Disp: , Rfl:   •  topiramate (TOPAMAX) 50 MG tablet, TOPAMAX 50 MG TABS, Disp: , Rfl:   Social  "History:   Social History     Tobacco Use   • Smoking status: Never Smoker   • Smokeless tobacco: Never Used   Substance Use Topics   • Alcohol use: Yes     Comment: occ      Family History:  Family History   Problem Relation Age of Onset   • Heart attack Mother    • Heart disease Maternal Grandfather         The following portions of the patient's history were reviewed and updated as appropriate: allergies, current medications, past family history, past medical history, past social history, past surgical history and problem list.    Review of Systems   Cardiovascular: Negative for chest pain, leg swelling and palpitations.   Respiratory: Negative for shortness of breath.    Neurological: Negative for dizziness, light-headedness and numbness.         ECG 12 Lead  Date/Time: 9/30/2019 6:33 PM  Performed by: Puma Butt MD  Authorized by: Puma Butt MD   Comparison: compared with previous ECG   Comparison to previous ECG: EKG done today reviewed by me shows sinus rhythm with rate of 80 bpm with nonspecific ST-T changes seen in lead III and aVF unchanged from EKG from 12/4/2018  Rhythm: sinus rhythm  BPM: 80  ST Depression: II, III and aVF  Other findings: non-specific ST-T wave changes    Clinical impression: non-specific ECG               Objective:     Physical Exam   /75 (BP Location: Left arm, Patient Position: Sitting, Cuff Size: Adult)   Pulse 85   Ht 157.5 cm (62\")   Wt 89.4 kg (197 lb)   SpO2 100%   BMI 36.03 kg/m²   General:  Appears in no acute distress  Eyes: Sclera is anicteric,  conjunctiva is clear   HEENT:  No JVD. Thyroid not visibly enlarged. No mucosal pallor or cyanosis  Respiratory: Respirations regular and unlabored at rest.  Bilaterally good breath sounds, with good air entry in all fields. No crackles, rubs or wheezes auscultated  Cardiovascular: S1,S2 Regular rate and rhythm. No murmur, rub or gallop auscultated. No pretibial pitting " edema  Gastrointestinal: Abdomen soft, flat, non tender. Bowel sounds present. No hepatosplenomegaly. No ascites  Musculoskeletal:  No abnormal movements  Extremities: No digital clubbing or cyanosis  Skin: Color pink. Skin warm and dry to touch. No rashes  No xanthoma  Neuro: Alert and awake, no lateralizing deficits appreciated    Lab Review:       Assessment:         No diagnosis found.    CC:  1 year follow up for hypertension, QTC prolongation    History of Present Illness:     This is a 29-year-old  who has PMH of     #. chest pain, history of normal stress  Cardiolite 09/10/2014  #. tachycardia  #. palpitation, normal tsh  #. hypertension since age 16,  workup including abdominal ultrasound for AAA, pheo labs  normal  #. obesity  #. anxiety disorder  #.  obesity and polycystic ovarian syndrome  #.  history of syncope, CT PE study, CT head negative, abnormal chest x-ray with interstitial infiltrate with normal BNP level of 50  #  Rheumatoid arthritis     here for   follow-up.  Patient denies any chest pain, shortness of breath, palpitations lightheadedness dizziness loss of consciousness.  Patient  is diagnosed with rheumatoid  arthritis. Last EKG from 06/29/2017 showed a , today's EKG shows QTC   of 439. Patient's blood pressure is 109/75, heart rate is 85 bpm, O2 sat is 100%  Reviewed labs done in  08/16/2018 which showed ESR of 17 CMP was unremarkable CBC revealed a hemoglobin of 11.4, previous. Hemoglobin A1c was elevated at 5.7 tsh is normal cholesterol is 2/8 triglycerides 101 HDL is low at 42 LDL is 145, CMP is unremarkable.  repeat labs 9/3/2019 revealed normal CMP, CBC, UA     ASSESSMENT:  #  hypertension  # . obesity/ PCOS  # prolonged QT due to psych meds  #  RA     PLAN:    will continue on bisoprolol hydrochlorothiazide BUN creatinine and potassium and normal on CMP from June  Will hold off on Holter monitors since she is not having any further symptoms  Discussed with patient  at length  about beta-blockers in pregnancy, patient is on oral contraceptive pills   advised her to check blood pressure on a regular basis and report if it is elevated I will add amlodipine.   counseled on weight loss diet and exercise  Will monitor QTC with repeat EKG in follow-up   reviewed EKG results with patient             Plan:

## 2019-10-14 RX ORDER — SULFASALAZINE 500 MG/1
TABLET ORAL
Qty: 120 TABLET | Refills: 2 | Status: SHIPPED | OUTPATIENT
Start: 2019-10-14 | End: 2020-02-27 | Stop reason: SDUPTHER

## 2019-11-11 RX ORDER — TOPIRAMATE 50 MG/1
TABLET, FILM COATED ORAL
Qty: 90 TABLET | Refills: 1 | Status: SHIPPED | OUTPATIENT
Start: 2019-11-11

## 2020-01-07 RX ORDER — TOPIRAMATE 50 MG/1
TABLET, FILM COATED ORAL
Qty: 90 TABLET | Refills: 0 | OUTPATIENT
Start: 2020-01-07

## 2020-01-15 RX ORDER — SULFASALAZINE 500 MG/1
TABLET ORAL
Qty: 120 TABLET | Refills: 1 | OUTPATIENT
Start: 2020-01-15

## 2020-02-22 ENCOUNTER — LAB (OUTPATIENT)
Dept: LAB | Facility: HOSPITAL | Age: 30
End: 2020-02-22

## 2020-02-22 DIAGNOSIS — M19.90 INFLAMMATORY ARTHRITIS: ICD-10-CM

## 2020-02-22 DIAGNOSIS — Z79.899 LONG-TERM USE OF HIGH-RISK MEDICATION: ICD-10-CM

## 2020-02-22 LAB
ALBUMIN SERPL-MCNC: 4.2 G/DL (ref 3.5–5.2)
ALBUMIN/GLOB SERPL: 1.7 G/DL
ALP SERPL-CCNC: 69 U/L (ref 39–117)
ALT SERPL W P-5'-P-CCNC: 23 U/L (ref 1–33)
ANION GAP SERPL CALCULATED.3IONS-SCNC: 10.1 MMOL/L (ref 5–15)
AST SERPL-CCNC: 21 U/L (ref 1–32)
BILIRUB SERPL-MCNC: 0.2 MG/DL (ref 0.2–1.2)
BUN BLD-MCNC: 15 MG/DL (ref 6–20)
BUN/CREAT SERPL: 16.9 (ref 7–25)
CALCIUM SPEC-SCNC: 8.9 MG/DL (ref 8.6–10.5)
CHLORIDE SERPL-SCNC: 106 MMOL/L (ref 98–107)
CO2 SERPL-SCNC: 21.9 MMOL/L (ref 22–29)
CREAT BLD-MCNC: 0.89 MG/DL (ref 0.57–1)
GFR SERPL CREATININE-BSD FRML MDRD: 74 ML/MIN/1.73
GLOBULIN UR ELPH-MCNC: 2.5 GM/DL
GLUCOSE BLD-MCNC: 88 MG/DL (ref 65–99)
POTASSIUM BLD-SCNC: 4.1 MMOL/L (ref 3.5–5.2)
PROT SERPL-MCNC: 6.7 G/DL (ref 6–8.5)
SODIUM BLD-SCNC: 138 MMOL/L (ref 136–145)

## 2020-02-22 PROCEDURE — 80053 COMPREHEN METABOLIC PANEL: CPT

## 2020-02-22 PROCEDURE — 36415 COLL VENOUS BLD VENIPUNCTURE: CPT

## 2020-02-27 ENCOUNTER — OFFICE VISIT (OUTPATIENT)
Dept: RHEUMATOLOGY | Facility: CLINIC | Age: 30
End: 2020-02-27

## 2020-02-27 ENCOUNTER — APPOINTMENT (OUTPATIENT)
Dept: LAB | Facility: HOSPITAL | Age: 30
End: 2020-02-27

## 2020-02-27 ENCOUNTER — LAB REQUISITION (OUTPATIENT)
Dept: LAB | Facility: HOSPITAL | Age: 30
End: 2020-02-27

## 2020-02-27 VITALS
WEIGHT: 215 LBS | SYSTOLIC BLOOD PRESSURE: 96 MMHG | BODY MASS INDEX: 39.56 KG/M2 | DIASTOLIC BLOOD PRESSURE: 66 MMHG | HEART RATE: 80 BPM | HEIGHT: 62 IN

## 2020-02-27 DIAGNOSIS — G56.03 BILATERAL CARPAL TUNNEL SYNDROME: ICD-10-CM

## 2020-02-27 DIAGNOSIS — M06.00 SERONEGATIVE RHEUMATOID ARTHRITIS (HCC): Primary | ICD-10-CM

## 2020-02-27 DIAGNOSIS — Z79.899 LONG-TERM USE OF HIGH-RISK MEDICATION: ICD-10-CM

## 2020-02-27 DIAGNOSIS — M06.09 RHEUMATOID ARTHRITIS WITHOUT RHEUMATOID FACTOR, MULTIPLE SITES (HCC): ICD-10-CM

## 2020-02-27 LAB
BASOPHILS # BLD AUTO: 0.04 10*3/MM3 (ref 0–0.2)
BASOPHILS NFR BLD AUTO: 0.8 % (ref 0–1.5)
CRP SERPL-MCNC: 0.17 MG/DL (ref 0–0.5)
DEPRECATED RDW RBC AUTO: 40 FL (ref 37–54)
EOSINOPHIL # BLD AUTO: 0.08 10*3/MM3 (ref 0–0.4)
EOSINOPHIL NFR BLD AUTO: 1.5 % (ref 0.3–6.2)
ERYTHROCYTE [DISTWIDTH] IN BLOOD BY AUTOMATED COUNT: 12.1 % (ref 12.3–15.4)
ERYTHROCYTE [SEDIMENTATION RATE] IN BLOOD: 10 MM/HR (ref 0–20)
HCT VFR BLD AUTO: 35.1 % (ref 34–46.6)
HGB BLD-MCNC: 11.6 G/DL (ref 12–15.9)
IMM GRANULOCYTES # BLD AUTO: 0.02 10*3/MM3 (ref 0–0.05)
IMM GRANULOCYTES NFR BLD AUTO: 0.4 % (ref 0–0.5)
LYMPHOCYTES # BLD AUTO: 1.69 10*3/MM3 (ref 0.7–3.1)
LYMPHOCYTES NFR BLD AUTO: 31.7 % (ref 19.6–45.3)
MCH RBC QN AUTO: 30.4 PG (ref 26.6–33)
MCHC RBC AUTO-ENTMCNC: 33 G/DL (ref 31.5–35.7)
MCV RBC AUTO: 92.1 FL (ref 79–97)
MONOCYTES # BLD AUTO: 0.5 10*3/MM3 (ref 0.1–0.9)
MONOCYTES NFR BLD AUTO: 9.4 % (ref 5–12)
NEUTROPHILS # BLD AUTO: 3 10*3/MM3 (ref 1.7–7)
NEUTROPHILS NFR BLD AUTO: 56.2 % (ref 42.7–76)
NRBC BLD AUTO-RTO: 0 /100 WBC (ref 0–0.2)
PLATELET # BLD AUTO: 308 10*3/MM3 (ref 140–450)
PMV BLD AUTO: 11 FL (ref 6–12)
RBC # BLD AUTO: 3.81 10*6/MM3 (ref 3.77–5.28)
WBC NRBC COR # BLD: 5.33 10*3/MM3 (ref 3.4–10.8)

## 2020-02-27 PROCEDURE — 36415 COLL VENOUS BLD VENIPUNCTURE: CPT | Performed by: INTERNAL MEDICINE

## 2020-02-27 PROCEDURE — 99214 OFFICE O/P EST MOD 30 MIN: CPT | Performed by: INTERNAL MEDICINE

## 2020-02-27 PROCEDURE — 85025 COMPLETE CBC W/AUTO DIFF WBC: CPT | Performed by: INTERNAL MEDICINE

## 2020-02-27 PROCEDURE — 85652 RBC SED RATE AUTOMATED: CPT | Performed by: INTERNAL MEDICINE

## 2020-02-27 PROCEDURE — 86140 C-REACTIVE PROTEIN: CPT | Performed by: INTERNAL MEDICINE

## 2020-02-27 RX ORDER — SULFASALAZINE 500 MG/1
1000 TABLET ORAL 2 TIMES DAILY
Qty: 360 TABLET | Refills: 0 | Status: SHIPPED | OUTPATIENT
Start: 2020-02-27 | End: 2020-06-01

## 2020-02-27 RX ORDER — HYDROXYCHLOROQUINE SULFATE 200 MG/1
200 TABLET, FILM COATED ORAL 2 TIMES DAILY
Qty: 180 TABLET | Refills: 1 | Status: SHIPPED | OUTPATIENT
Start: 2020-02-27

## 2020-02-27 NOTE — PROGRESS NOTES
HPI:  This is a very pleasant 30-year-old female who suffers from seronegative rheumatoid arthritis and comes today in follow-up for management of this condition.  The patient was seen in the office 9/3/2019, at that time, the patient was continued with her medications which include sulfasalazine 2 g a day and Plaquenil 400 mg p.o. twice daily.  She has been compliant with this treatment, denies side effects.  She was recommended to proceed with a Vectra DA as she is still presented with tenderness in a few joints but there were no signs of synovitis.  The patient unfortunately did not do this laboratory testing.    Today she comes complaining of joint pain that is rated 7 out of 10, is present in her hands, she has about 2 to 3 minutes of morning stiffness.  Reports cramping in her hands especially in the right 1.  Sometimes she has numbness and burning sensation in the tips of her fingers.  She was diagnosed with carpal tunnel syndrome a while ago by her hand orthopedic doctor and received corticosteroid injections which helped.  She has not noticed joint swelling.  There are no other joints affected.  She has no red or hot joints.    Review of systems is negative for fever or chills, denies oral nasal ulcers, no nausea, vomiting, diarrhea or constipation.  No dysuria, no urinary frequency.  All other systems reviewed and they were negative.    There are no recent laboratories for review today, except for a CMPWhich shows normal creatinine and normal liver function test.    Social and family history reviewed and unchanged.      RHEUMATOLOGY HISTORY    SERO (-) RA      SSZ (10/17) 1000mg BID      Plaquenil (10/17) 200mg BID       HISTORY OF PUD; NO NSAIDS      Past Medical History:   Diagnosis Date   • Hypertension    • Leaky heart valve    • Migraines    • PCOS (polycystic ovarian syndrome)    • Rheumatoid arteritis        Current Outpatient Medications   Medication Sig Dispense Refill   • azelastine (OPTIVAR)  0.05 % ophthalmic solution AZELASTINE HCL 0.05 % SOLN     • bisoprolol-hydrochlorothiazide (ZIAC) 10-6.25 MG per tablet Take 1 tablet by mouth Daily. 90 tablet 3   • buPROPion XL (WELLBUTRIN XL) 150 MG 24 hr tablet BUPROPION HCL ER (XL) 150 MG XR24H-TAB     • dicyclomine (BENTYL) 20 MG tablet Take 10 mg by mouth 2 (Two) Times a Day.     • fexofenadine (ALLEGRA) 180 MG tablet Daily.     • hydroxychloroquine (PLAQUENIL) 200 MG tablet Take 1 tablet by mouth 2 (Two) Times a Day. 180 tablet 3   • lurasidone HCl (LATUDA) 60 MG tablet tablet Daily.     • metFORMIN (GLUCOPHAGE) 500 MG tablet Take 500 mg by mouth 2 (Two) Times a Day.     • rosuvastatin (CRESTOR) 20 MG tablet Take 20 mg by mouth Daily.     • spironolactone (ALDACTONE) 50 MG tablet SPIRONOLACTONE 50 MG TABS     • sulfaSALAzine (AZULFIDINE) 500 MG tablet TAKE TWO TABLETS BY MOUTH TWICE A  tablet 2   • SUMAtriptan (IMITREX) 100 MG tablet IMITREX 100 MG TABS     • topiramate (TOPAMAX) 50 MG tablet TAKE ONE TABLET BY MOUTH EVERY MORNING AND TAKE TWO TABLETS EVERY NIGHT AT BEDTIME 90 tablet 1     No current facility-administered medications for this visit.        Physical exam:    Vitals:    02/27/20 1434   BP: 96/66   Pulse: 80     GENERAL: Well-developed, well-nourished in no acute distress. Alert and oriented x3.  HEENT: Normocephalic, atraumatic. Pupils are equal, round, and reactive to light. Extraocular muscles are intact. Mucous membranes are pink and moist. Nostrils are clear.   NECK: Supple without lymphadenopathy.  LUNGS: Clear to auscultation bilaterally.  HEART: Regular rate and rhythm without murmur, rub or gallop.  CHEST: Respirations easy and unlabored.  EXTREMITIES: No cyanosis, edema or clubbing.  SKIN: Warm, dry and intact.  MSK: There is tenderness in both breasts, second third and fourth right MCP, second and third PIP joint.  There is mild tenderness in the third MCP joint of the left hand and the third PIP joint.  No synovitis noted.   Clinical positive on the right.  Phalen test negative.        Assessment:  1.  Seronegative inflammatory arthritis.  Physical exam shows tenderness in the few joints.  I do not see signs of synovitis.  I tend to think that some of her symptoms are related to her carpal tunnel syndrome.  We will proceed with a Vectra DA today and if need be an ultrasound of the hands to evaluate for synovitis.  For now we will continue with the same treatment with no changes.    2.  Carpal tunnel syndrome on the right.  Recommended to the patient to use a wrist splint, follow-up with Ortho to consider corticosteroid injections.    3.  High risk medications, the patient was recommended to keep up-to-date with her regular eye exams, vaccinations and age-appropriate cancer screening.    Cancer screening   Colonoscopy:no PAP: 2018 Mammogram: 2019 left breast   Bone health  calcium and vitamin D: no , DXA: no   Vaccines  Flu: 2019  PNA 13:9/2019  Zoster: NO    X-rays of the chest:  Hands:  Feet:   Hepatitis panel:  HIV: QTB/PPD    #4  Body mass index of 39.3.  She was recommended to lose weight and engage in daily low impact exercises, keep a healthy diet.      Orders Placed This Encounter   Procedures   • XR Foot 2 View Bilateral     Standing Status:   Future     Standing Expiration Date:   2/27/2021     Order Specific Question:   Reason for Exam:     Answer:   patient with inflammatory arthritis, baseline x-rays     Order Specific Question:   Patient Pregnant     Answer:   No   • XR Hand 2 View Bilateral     Standing Status:   Future     Standing Expiration Date:   2/27/2021     Order Specific Question:   Reason for Exam:     Answer:   Patient complaining of chronic polyarthralgias.  Please evaluate for inflammatory erosive arthropathy.   • XR Chest 2 View     Standing Status:   Future     Standing Expiration Date:   2/27/2021     Order Specific Question:   Reason for Exam:     Answer:   Baseline x-rays patient with RA     Order  Specific Question:   Patient Pregnant     Answer:   No   • Sedimentation Rate   • C-reactive Protein   • Comprehensive Metabolic Panel     Standing Status:   Future     Standing Expiration Date:   2/27/2021   • Sedimentation Rate     Standing Status:   Future     Standing Expiration Date:   2/27/2021   • C-reactive Protein     Standing Status:   Future     Standing Expiration Date:   2/27/2021   • Mirtha(R) DA Disease Activity   • CBC Auto Differential   • CBC & Differential     Order Specific Question:   Manual Differential     Answer:   No   • CBC & Differential     Standing Status:   Future     Order Specific Question:   Manual Differential     Answer:   No

## 2020-03-07 ENCOUNTER — TRANSCRIBE ORDERS (OUTPATIENT)
Dept: ADMINISTRATIVE | Facility: HOSPITAL | Age: 30
End: 2020-03-07

## 2020-03-07 ENCOUNTER — HOSPITAL ENCOUNTER (OUTPATIENT)
Dept: CARDIOLOGY | Facility: HOSPITAL | Age: 30
Discharge: HOME | End: 2020-03-07
Admitting: PSYCHIATRY & NEUROLOGY

## 2020-03-07 ENCOUNTER — LAB (OUTPATIENT)
Dept: LAB | Facility: HOSPITAL | Age: 30
End: 2020-03-07

## 2020-03-07 DIAGNOSIS — Z51.81 ENCOUNTER FOR THERAPEUTIC DRUG MONITORING: ICD-10-CM

## 2020-03-07 DIAGNOSIS — F31.81 BIPOLAR II DISORDER (HCC): ICD-10-CM

## 2020-03-07 DIAGNOSIS — F31.81 BIPOLAR II DISORDER (HCC): Primary | ICD-10-CM

## 2020-03-07 LAB
ALBUMIN SERPL-MCNC: 4.3 G/DL (ref 3.5–5.2)
ALBUMIN/GLOB SERPL: 1.9 G/DL
ALP SERPL-CCNC: 68 U/L (ref 39–117)
ALT SERPL W P-5'-P-CCNC: 26 U/L (ref 1–33)
ANION GAP SERPL CALCULATED.3IONS-SCNC: 10.8 MMOL/L (ref 5–15)
AST SERPL-CCNC: 22 U/L (ref 1–32)
BACTERIA UR QL AUTO: ABNORMAL /HPF
BASOPHILS # BLD AUTO: 0.05 10*3/MM3 (ref 0–0.2)
BASOPHILS NFR BLD AUTO: 1 % (ref 0–1.5)
BILIRUB SERPL-MCNC: 0.2 MG/DL (ref 0.2–1.2)
BILIRUB UR QL STRIP: NEGATIVE
BUN BLD-MCNC: 14 MG/DL (ref 6–20)
BUN/CREAT SERPL: 18.7 (ref 7–25)
CALCIUM SPEC-SCNC: 8.8 MG/DL (ref 8.6–10.5)
CHLORIDE SERPL-SCNC: 102 MMOL/L (ref 98–107)
CHOLEST SERPL-MCNC: 115 MG/DL (ref 0–200)
CLARITY UR: CLEAR
CO2 SERPL-SCNC: 22.2 MMOL/L (ref 22–29)
COLOR UR: YELLOW
CREAT BLD-MCNC: 0.75 MG/DL (ref 0.57–1)
DEPRECATED RDW RBC AUTO: 40.1 FL (ref 37–54)
EOSINOPHIL # BLD AUTO: 0.08 10*3/MM3 (ref 0–0.4)
EOSINOPHIL NFR BLD AUTO: 1.6 % (ref 0.3–6.2)
ERYTHROCYTE [DISTWIDTH] IN BLOOD BY AUTOMATED COUNT: 11.8 % (ref 12.3–15.4)
GFR SERPL CREATININE-BSD FRML MDRD: 90 ML/MIN/1.73
GLOBULIN UR ELPH-MCNC: 2.3 GM/DL
GLUCOSE BLD-MCNC: 97 MG/DL (ref 65–99)
GLUCOSE UR STRIP-MCNC: NEGATIVE MG/DL
HCT VFR BLD AUTO: 34.8 % (ref 34–46.6)
HDLC SERPL-MCNC: 56 MG/DL (ref 40–60)
HGB BLD-MCNC: 11.5 G/DL (ref 12–15.9)
HGB UR QL STRIP.AUTO: NEGATIVE
HYALINE CASTS UR QL AUTO: ABNORMAL /LPF
IMM GRANULOCYTES # BLD AUTO: 0.01 10*3/MM3 (ref 0–0.05)
IMM GRANULOCYTES NFR BLD AUTO: 0.2 % (ref 0–0.5)
KETONES UR QL STRIP: NEGATIVE
LDLC SERPL CALC-MCNC: 51 MG/DL (ref 0–100)
LDLC/HDLC SERPL: 0.9 {RATIO}
LEUKOCYTE ESTERASE UR QL STRIP.AUTO: NEGATIVE
LITHIUM SERPL-SCNC: 0.2 MMOL/L (ref 0.6–1.2)
LYMPHOCYTES # BLD AUTO: 1.51 10*3/MM3 (ref 0.7–3.1)
LYMPHOCYTES NFR BLD AUTO: 29.9 % (ref 19.6–45.3)
MCH RBC QN AUTO: 30.5 PG (ref 26.6–33)
MCHC RBC AUTO-ENTMCNC: 33 G/DL (ref 31.5–35.7)
MCV RBC AUTO: 92.3 FL (ref 79–97)
MONOCYTES # BLD AUTO: 0.36 10*3/MM3 (ref 0.1–0.9)
MONOCYTES NFR BLD AUTO: 7.1 % (ref 5–12)
NEUTROPHILS # BLD AUTO: 3.04 10*3/MM3 (ref 1.7–7)
NEUTROPHILS NFR BLD AUTO: 60.2 % (ref 42.7–76)
NITRITE UR QL STRIP: NEGATIVE
NRBC BLD AUTO-RTO: 0 /100 WBC (ref 0–0.2)
PH UR STRIP.AUTO: 6 [PH] (ref 5–8)
PLATELET # BLD AUTO: 272 10*3/MM3 (ref 140–450)
PMV BLD AUTO: 11.2 FL (ref 6–12)
POTASSIUM BLD-SCNC: 4 MMOL/L (ref 3.5–5.2)
PROT SERPL-MCNC: 6.6 G/DL (ref 6–8.5)
PROT UR QL STRIP: NEGATIVE
RBC # BLD AUTO: 3.77 10*6/MM3 (ref 3.77–5.28)
RBC # UR: ABNORMAL /HPF
REF LAB TEST METHOD: ABNORMAL
SODIUM BLD-SCNC: 135 MMOL/L (ref 136–145)
SP GR UR STRIP: 1.02 (ref 1–1.03)
SQUAMOUS #/AREA URNS HPF: ABNORMAL /HPF
T4 SERPL-MCNC: 5.85 MCG/DL (ref 4.5–11.7)
TRIGL SERPL-MCNC: 42 MG/DL (ref 0–150)
TSH SERPL DL<=0.05 MIU/L-ACNC: 4.21 UIU/ML (ref 0.27–4.2)
UROBILINOGEN UR QL STRIP: NORMAL
VLDLC SERPL-MCNC: 8.4 MG/DL (ref 5–40)
WBC NRBC COR # BLD: 5.05 10*3/MM3 (ref 3.4–10.8)
WBC UR QL AUTO: ABNORMAL /HPF

## 2020-03-07 PROCEDURE — 36415 COLL VENOUS BLD VENIPUNCTURE: CPT

## 2020-03-07 PROCEDURE — 80061 LIPID PANEL: CPT

## 2020-03-07 PROCEDURE — 93005 ELECTROCARDIOGRAM TRACING: CPT | Performed by: PSYCHIATRY & NEUROLOGY

## 2020-03-07 PROCEDURE — 80178 ASSAY OF LITHIUM: CPT

## 2020-03-07 PROCEDURE — 84436 ASSAY OF TOTAL THYROXINE: CPT

## 2020-03-07 PROCEDURE — 84443 ASSAY THYROID STIM HORMONE: CPT

## 2020-03-07 PROCEDURE — 80053 COMPREHEN METABOLIC PANEL: CPT

## 2020-03-07 PROCEDURE — 81001 URINALYSIS AUTO W/SCOPE: CPT

## 2020-03-07 PROCEDURE — 85025 COMPLETE CBC W/AUTO DIFF WBC: CPT

## 2020-03-08 PROCEDURE — 93010 ELECTROCARDIOGRAM REPORT: CPT | Performed by: INTERNAL MEDICINE

## 2020-04-02 RX ORDER — TOPIRAMATE 50 MG/1
TABLET, FILM COATED ORAL
Qty: 90 TABLET | Refills: 0 | OUTPATIENT
Start: 2020-04-02

## 2020-04-14 ENCOUNTER — TELEPHONE (OUTPATIENT)
Dept: RHEUMATOLOGY | Facility: CLINIC | Age: 30
End: 2020-04-14

## 2020-04-14 NOTE — TELEPHONE ENCOUNTER
Diclofenac 1% gel - PA Approved.  Coverage dates: 4/13/20 - 4/13/21.  PA# [id number].  I faxed PA to [facility] in [place].

## 2020-06-01 DIAGNOSIS — M06.00 SERONEGATIVE RHEUMATOID ARTHRITIS (HCC): ICD-10-CM

## 2020-06-01 DIAGNOSIS — Z79.899 LONG-TERM USE OF HIGH-RISK MEDICATION: ICD-10-CM

## 2020-06-01 RX ORDER — SULFASALAZINE 500 MG/1
TABLET ORAL
Qty: 120 TABLET | Refills: 1 | Status: SHIPPED | OUTPATIENT
Start: 2020-06-01

## 2020-10-21 RX ORDER — BISOPROLOL FUMARATE AND HYDROCHLOROTHIAZIDE 10; 6.25 MG/1; MG/1
TABLET ORAL
Qty: 90 TABLET | Refills: 1 | Status: SHIPPED | OUTPATIENT
Start: 2020-10-21 | End: 2021-04-23

## 2020-11-02 ENCOUNTER — OFFICE VISIT (OUTPATIENT)
Dept: CARDIOLOGY | Facility: CLINIC | Age: 30
End: 2020-11-02

## 2020-11-02 VITALS
OXYGEN SATURATION: 98 % | DIASTOLIC BLOOD PRESSURE: 60 MMHG | BODY MASS INDEX: 41.96 KG/M2 | HEIGHT: 62 IN | WEIGHT: 228 LBS | SYSTOLIC BLOOD PRESSURE: 118 MMHG | HEART RATE: 82 BPM

## 2020-11-02 DIAGNOSIS — E66.01 MORBID OBESITY WITH BMI OF 40.0-44.9, ADULT (HCC): ICD-10-CM

## 2020-11-02 DIAGNOSIS — E28.2 PCOS (POLYCYSTIC OVARIAN SYNDROME): ICD-10-CM

## 2020-11-02 DIAGNOSIS — I10 ESSENTIAL HYPERTENSION: Primary | ICD-10-CM

## 2020-11-02 PROCEDURE — 99214 OFFICE O/P EST MOD 30 MIN: CPT | Performed by: INTERNAL MEDICINE

## 2020-11-02 PROCEDURE — 93000 ELECTROCARDIOGRAM COMPLETE: CPT | Performed by: INTERNAL MEDICINE

## 2020-11-02 RX ORDER — DICYCLOMINE HYDROCHLORIDE 10 MG/1
CAPSULE ORAL
COMMUNITY
End: 2020-11-02

## 2020-11-02 RX ORDER — LURASIDONE HYDROCHLORIDE 80 MG/1
TABLET, FILM COATED ORAL
COMMUNITY
Start: 2020-08-27 | End: 2022-05-20

## 2020-11-02 RX ORDER — SPIRONOLACTONE 25 MG/1
TABLET ORAL
COMMUNITY
Start: 2020-10-26 | End: 2023-03-15

## 2020-11-02 RX ORDER — LITHIUM CARBONATE 150 MG/1
CAPSULE ORAL
COMMUNITY
Start: 2020-08-27 | End: 2022-06-28

## 2020-11-02 RX ORDER — ROSUVASTATIN CALCIUM 10 MG/1
TABLET, COATED ORAL
COMMUNITY
Start: 2020-10-15

## 2020-11-02 NOTE — PROGRESS NOTES
Subjective:     Encounter Date:11/02/2020      Patient ID: Sunita Matamoros is a 30 y.o. female.    Chief Complaint : Follow-up for hypertension, QTC prolongation  History of Present Illness      This is a 30-year-old  who has PMH of     #. chest pain, history of normal stress  Cardiolite 09/10/2014  #. tachycardia  #. palpitation, normal tsh  #. hypertension since age 16,  workup including abdominal ultrasound for AAA, pheo labs  normal  #. obesity  #. anxiety disorder, rheumatoid arthritis  #.  obesity and polycystic ovarian syndrome  #.  history of syncope, CT PE study, CT head negative, abnormal chest x-ray with interstitial infiltrate with normal BNP level of 50  #  Rheumatoid arthritis  #Cholecystectomy, hiatal hernia repair  #Allergies/intolerance to amoxicillin, penicillin, Carafate     here for   follow-up.  Patient denies any chest pain, shortness of breath, palpitations lightheadedness dizziness loss of consciousness.  Patient had last EKG from 06/29/2017 showed a , today's EKG shows  .. Patient's blood pressure is 119/60, heart rate 82, O2 sat of 98% on room air.  Reviewed labs done in  08/16/2018 which showed ESR of 17 CMP was unremarkable CBC revealed a hemoglobin of 11.4, previous. Hemoglobin A1c was elevated at 5.7 tsh is normal cholesterol is 2/8 triglycerides 101 HDL is low at 42 LDL is 145, CMP is unremarkable.  repeat labs 9/3/2019 revealed normal CMP, CBC, UA     ASSESSMENT:  #  hypertension  # . obesity/ PCOS  # prolonged QT due to psych meds  #  RA     PLAN:  Reviewed EKG results with patient  We will continue medical management with bisoprolol hydrochlorothiazide and Aldactone and Crestor.  We will check CMP, lipid profile and hemoglobin A1c now and then in a year.  Discussed with patient  at length about beta-blockers in pregnancy, patient is on oral contraceptive pills   advised her to check blood pressure on a regular basis and report if it is elevated I will add  amlodipine.   counseled on weight loss diet and exercise  Will monitor QTC with repeat EKG in follow-up  Counseled on diet exercise.          Assessment:          Diagnosis Plan   1. Essential hypertension  ECG 12 Lead    Comprehensive Metabolic Panel    Lipid Panel    TSH    Hemoglobin A1c    Comprehensive Metabolic Panel    Lipid Panel    Hemoglobin A1c   2. Morbid obesity with BMI of 40.0-44.9, adult (CMS/HCC)  ECG 12 Lead    Comprehensive Metabolic Panel    Lipid Panel    TSH    Hemoglobin A1c    Comprehensive Metabolic Panel    Lipid Panel    Hemoglobin A1c   3. PCOS (polycystic ovarian syndrome)  ECG 12 Lead    Comprehensive Metabolic Panel    Lipid Panel    TSH    Hemoglobin A1c    Comprehensive Metabolic Panel    Lipid Panel    Hemoglobin A1c          Plan:         Past Medical History:  Past Medical History:   Diagnosis Date   • Asthma    • GERD (gastroesophageal reflux disease)    • Hypertension    • Leaky heart valve    • Migraines    • PCOS (polycystic ovarian syndrome)    • Rheumatoid arteritis (CMS/HCC)    • Tachycardia      Past Surgical History:  Past Surgical History:   Procedure Laterality Date   • HIATAL HERNIA REPAIR  12/2018    with transoral fundoplication   • LAPAROSCOPIC CHOLECYSTECTOMY  12/2018      Allergies:  Allergies   Allergen Reactions   • Amoxicillin Hives   • Penicillin G Hives   • Carafate [Sucralfate] Hives     Home Meds:  Current Meds:     Current Outpatient Medications:   •  azelastine (OPTIVAR) 0.05 % ophthalmic solution, AZELASTINE HCL 0.05 % SOLN, Disp: , Rfl:   •  bisoprolol-hydrochlorothiazide (ZIAC) 10-6.25 MG per tablet, TAKE ONE TABLET BY MOUTH DAILY, Disp: 90 tablet, Rfl: 1  •  buPROPion XL (WELLBUTRIN XL) 150 MG 24 hr tablet, BUPROPION HCL ER (XL) 150 MG XR24H-TAB, Disp: , Rfl:   •  diclofenac (VOLTAREN) 1 % gel gel, Apply 4 g topically to the appropriate area as directed 4 (Four) Times a Day As Needed (joint pain)., Disp: 1 tube, Rfl: 2  •  fexofenadine (ALLEGRA) 180  MG tablet, Daily., Disp: , Rfl:   •  hydroxychloroquine (PLAQUENIL) 200 MG tablet, Take 1 tablet by mouth 2 (Two) Times a Day., Disp: 180 tablet, Rfl: 1  •  Latuda 80 MG tablet tablet, , Disp: , Rfl:   •  lithium carbonate 150 MG capsule, , Disp: , Rfl:   •  metFORMIN (GLUCOPHAGE) 500 MG tablet, Take 500 mg by mouth 2 (Two) Times a Day., Disp: , Rfl:   •  rosuvastatin (CRESTOR) 10 MG tablet, , Disp: , Rfl:   •  spironolactone (ALDACTONE) 25 MG tablet, , Disp: , Rfl:   •  Sprintec 28 0.25-35 MG-MCG per tablet, , Disp: , Rfl:   •  sulfaSALAzine (AZULFIDINE) 500 MG tablet, TAKE TWO TABLETS BY MOUTH TWICE A DAY, Disp: 120 tablet, Rfl: 1  •  SUMAtriptan (IMITREX) 100 MG tablet, IMITREX 100 MG TABS, Disp: , Rfl:   •  topiramate (TOPAMAX) 50 MG tablet, TAKE ONE TABLET BY MOUTH EVERY MORNING AND TAKE TWO TABLETS EVERY NIGHT AT BEDTIME, Disp: 90 tablet, Rfl: 1  Social History:   Social History     Tobacco Use   • Smoking status: Never Smoker   • Smokeless tobacco: Never Used   Substance Use Topics   • Alcohol use: Yes     Comment: occ      Family History:  Family History   Problem Relation Age of Onset   • Heart attack Mother    • Heart disease Maternal Grandfather         The following portions of the patient's history were reviewed and updated as appropriate: allergies, current medications, past family history, past medical history, past social history, past surgical history and problem list.      Review of Systems   Constitution: Negative for fever and malaise/fatigue.   HENT: Negative for congestion and hearing loss.    Eyes: Negative for double vision and visual disturbance.   Cardiovascular: Negative for chest pain, claudication, dyspnea on exertion, leg swelling, palpitations and syncope.   Respiratory: Negative for cough and shortness of breath.    Endocrine: Negative for cold intolerance.   Skin: Negative for color change and rash.   Musculoskeletal: Negative for arthritis and joint pain.   Gastrointestinal:  "Negative for abdominal pain and heartburn.   Genitourinary: Negative for hematuria.   Neurological: Negative for excessive daytime sleepiness and dizziness.   Psychiatric/Behavioral: Negative for depression. The patient is not nervous/anxious.    All other systems reviewed and are negative.    All other systems are negative      ECG 12 Lead    Date/Time: 11/2/2020 3:57 PM  Performed by: Puma Butt MD  Authorized by: Puma Butt MD   Comparison: compared with previous ECG from 3/7/2020  Comparison to previous ECG: EKG done today reviewed by me shows sinus rhythm at the rate of 80 bpm with QT duration of 4 to 5 ms, nonspecific ST-T changes, no new change compared to old EKG from 3/7/2020                 Objective:     Physical Exam  /60   Pulse 82   Ht 157.5 cm (62\")   Wt 103 kg (228 lb)   SpO2 98%   BMI 41.70 kg/m²   General:  Appears in no acute distress  Eyes: Sclera is anicteric,  conjunctiva is clear   HEENT:  No JVD. Thyroid not visibly enlarged. No mucosal pallor or cyanosis  Respiratory: Respirations regular and unlabored at rest.  Bilaterally good breath sounds, with good air entry in all fields. No crackles, rubs or wheezes auscultated  Cardiovascular: S1,S2 Regular rate and rhythm. No murmur, rub or gallop auscultated. No pretibial pitting edema  Gastrointestinal: Abdomen soft, flat, non tender. Bowel sounds present.   Musculoskeletal:  No abnormal movements  Extremities: No digital clubbing or cyanosis  Skin: Color pink. Skin warm and dry to touch. No rashes  No xanthoma  Neuro: Alert and awake, no lateralizing deficits appreciated    Lab Reviewed:                  "

## 2020-11-03 ENCOUNTER — LAB (OUTPATIENT)
Dept: LAB | Facility: HOSPITAL | Age: 30
End: 2020-11-03

## 2020-11-03 DIAGNOSIS — I10 ESSENTIAL HYPERTENSION: ICD-10-CM

## 2020-11-03 DIAGNOSIS — E28.2 PCOS (POLYCYSTIC OVARIAN SYNDROME): ICD-10-CM

## 2020-11-03 DIAGNOSIS — E66.01 MORBID OBESITY WITH BMI OF 40.0-44.9, ADULT (HCC): ICD-10-CM

## 2020-11-03 LAB
ALBUMIN SERPL-MCNC: 4.4 G/DL (ref 3.5–5.2)
ALBUMIN/GLOB SERPL: 1.8 G/DL
ALP SERPL-CCNC: 76 U/L (ref 39–117)
ALT SERPL W P-5'-P-CCNC: 19 U/L (ref 1–33)
ANION GAP SERPL CALCULATED.3IONS-SCNC: 7.1 MMOL/L (ref 5–15)
AST SERPL-CCNC: 20 U/L (ref 1–32)
BILIRUB SERPL-MCNC: 0.2 MG/DL (ref 0–1.2)
BUN SERPL-MCNC: 17 MG/DL (ref 6–20)
BUN/CREAT SERPL: 18.5 (ref 7–25)
CALCIUM SPEC-SCNC: 9.2 MG/DL (ref 8.6–10.5)
CHLORIDE SERPL-SCNC: 108 MMOL/L (ref 98–107)
CHOLEST SERPL-MCNC: 99 MG/DL (ref 0–200)
CO2 SERPL-SCNC: 23.9 MMOL/L (ref 22–29)
CREAT SERPL-MCNC: 0.92 MG/DL (ref 0.57–1)
GFR SERPL CREATININE-BSD FRML MDRD: 71 ML/MIN/1.73
GLOBULIN UR ELPH-MCNC: 2.4 GM/DL
GLUCOSE SERPL-MCNC: 86 MG/DL (ref 65–99)
HBA1C MFR BLD: 4.5 % (ref 3.5–5.6)
HDLC SERPL-MCNC: 49 MG/DL (ref 40–60)
LDLC SERPL CALC-MCNC: 38 MG/DL (ref 0–100)
LDLC/HDLC SERPL: 0.83 {RATIO}
POTASSIUM SERPL-SCNC: 3.8 MMOL/L (ref 3.5–5.2)
PROT SERPL-MCNC: 6.8 G/DL (ref 6–8.5)
SODIUM SERPL-SCNC: 139 MMOL/L (ref 136–145)
TRIGL SERPL-MCNC: 47 MG/DL (ref 0–150)
TSH SERPL DL<=0.05 MIU/L-ACNC: 4.03 UIU/ML (ref 0.27–4.2)
VLDLC SERPL-MCNC: 12 MG/DL (ref 5–40)

## 2020-11-03 PROCEDURE — 83036 HEMOGLOBIN GLYCOSYLATED A1C: CPT

## 2020-11-03 PROCEDURE — 36415 COLL VENOUS BLD VENIPUNCTURE: CPT

## 2020-11-03 PROCEDURE — 80053 COMPREHEN METABOLIC PANEL: CPT

## 2020-11-03 PROCEDURE — 84443 ASSAY THYROID STIM HORMONE: CPT

## 2020-11-03 PROCEDURE — 80061 LIPID PANEL: CPT

## 2021-04-23 RX ORDER — BISOPROLOL FUMARATE AND HYDROCHLOROTHIAZIDE 10; 6.25 MG/1; MG/1
TABLET ORAL
Qty: 90 TABLET | Refills: 3 | Status: SHIPPED | OUTPATIENT
Start: 2021-04-23 | End: 2022-03-30

## 2021-06-19 ENCOUNTER — LAB (OUTPATIENT)
Dept: LAB | Facility: HOSPITAL | Age: 31
End: 2021-06-19

## 2021-06-19 ENCOUNTER — HOSPITAL ENCOUNTER (OUTPATIENT)
Dept: CARDIOLOGY | Facility: HOSPITAL | Age: 31
Discharge: HOME | End: 2021-06-19

## 2021-06-19 ENCOUNTER — TRANSCRIBE ORDERS (OUTPATIENT)
Dept: ADMINISTRATIVE | Facility: HOSPITAL | Age: 31
End: 2021-06-19

## 2021-06-19 DIAGNOSIS — Z51.81 ENCOUNTER FOR THERAPEUTIC DRUG MONITORING: Primary | ICD-10-CM

## 2021-06-19 DIAGNOSIS — Z51.81 ENCOUNTER FOR THERAPEUTIC DRUG MONITORING: ICD-10-CM

## 2021-06-19 LAB
ALBUMIN SERPL-MCNC: 4.3 G/DL (ref 3.5–5.2)
ALBUMIN/GLOB SERPL: 1.5 G/DL
ALP SERPL-CCNC: 90 U/L (ref 39–117)
ALT SERPL W P-5'-P-CCNC: 33 U/L (ref 1–33)
ANION GAP SERPL CALCULATED.3IONS-SCNC: 10.5 MMOL/L (ref 5–15)
AST SERPL-CCNC: 20 U/L (ref 1–32)
BASOPHILS # BLD AUTO: 0.04 10*3/MM3 (ref 0–0.2)
BASOPHILS NFR BLD AUTO: 0.6 % (ref 0–1.5)
BILIRUB SERPL-MCNC: 0.2 MG/DL (ref 0–1.2)
BILIRUB UR QL STRIP: NEGATIVE
BUN SERPL-MCNC: 20 MG/DL (ref 6–20)
BUN/CREAT SERPL: 23 (ref 7–25)
CALCIUM SPEC-SCNC: 9.1 MG/DL (ref 8.6–10.5)
CHLORIDE SERPL-SCNC: 106 MMOL/L (ref 98–107)
CHOLEST SERPL-MCNC: 124 MG/DL (ref 0–200)
CLARITY UR: CLEAR
CO2 SERPL-SCNC: 19.5 MMOL/L (ref 22–29)
COLOR UR: YELLOW
CREAT SERPL-MCNC: 0.87 MG/DL (ref 0.57–1)
DEPRECATED RDW RBC AUTO: 43.1 FL (ref 37–54)
EOSINOPHIL # BLD AUTO: 0.09 10*3/MM3 (ref 0–0.4)
EOSINOPHIL NFR BLD AUTO: 1.4 % (ref 0.3–6.2)
ERYTHROCYTE [DISTWIDTH] IN BLOOD BY AUTOMATED COUNT: 13.6 % (ref 12.3–15.4)
GFR SERPL CREATININE-BSD FRML MDRD: 75 ML/MIN/1.73
GLOBULIN UR ELPH-MCNC: 2.9 GM/DL
GLUCOSE SERPL-MCNC: 74 MG/DL (ref 65–99)
GLUCOSE UR STRIP-MCNC: NEGATIVE MG/DL
HCT VFR BLD AUTO: 37.4 % (ref 34–46.6)
HDLC SERPL-MCNC: 58 MG/DL (ref 40–60)
HGB BLD-MCNC: 12.3 G/DL (ref 12–15.9)
HGB UR QL STRIP.AUTO: NEGATIVE
IMM GRANULOCYTES # BLD AUTO: 0.02 10*3/MM3 (ref 0–0.05)
IMM GRANULOCYTES NFR BLD AUTO: 0.3 % (ref 0–0.5)
KETONES UR QL STRIP: ABNORMAL
LDLC SERPL CALC-MCNC: 51 MG/DL (ref 0–100)
LDLC/HDLC SERPL: 0.88 {RATIO}
LEUKOCYTE ESTERASE UR QL STRIP.AUTO: NEGATIVE
LITHIUM SERPL-SCNC: 0.2 MMOL/L (ref 0.6–1.2)
LYMPHOCYTES # BLD AUTO: 1.45 10*3/MM3 (ref 0.7–3.1)
LYMPHOCYTES NFR BLD AUTO: 22.4 % (ref 19.6–45.3)
MCH RBC QN AUTO: 28.5 PG (ref 26.6–33)
MCHC RBC AUTO-ENTMCNC: 32.9 G/DL (ref 31.5–35.7)
MCV RBC AUTO: 86.6 FL (ref 79–97)
MONOCYTES # BLD AUTO: 0.4 10*3/MM3 (ref 0.1–0.9)
MONOCYTES NFR BLD AUTO: 6.2 % (ref 5–12)
NEUTROPHILS NFR BLD AUTO: 4.47 10*3/MM3 (ref 1.7–7)
NEUTROPHILS NFR BLD AUTO: 69.1 % (ref 42.7–76)
NITRITE UR QL STRIP: NEGATIVE
NRBC BLD AUTO-RTO: 0 /100 WBC (ref 0–0.2)
PH UR STRIP.AUTO: 5.5 [PH] (ref 5–8)
PLATELET # BLD AUTO: 292 10*3/MM3 (ref 140–450)
PMV BLD AUTO: 11.8 FL (ref 6–12)
POTASSIUM SERPL-SCNC: 4.1 MMOL/L (ref 3.5–5.2)
PROT SERPL-MCNC: 7.2 G/DL (ref 6–8.5)
PROT UR QL STRIP: ABNORMAL
RBC # BLD AUTO: 4.32 10*6/MM3 (ref 3.77–5.28)
SODIUM SERPL-SCNC: 136 MMOL/L (ref 136–145)
SP GR UR STRIP: 1.03 (ref 1–1.03)
T4 SERPL-MCNC: 7.98 MCG/DL (ref 4.5–11.7)
TRIGL SERPL-MCNC: 76 MG/DL (ref 0–150)
TSH SERPL DL<=0.05 MIU/L-ACNC: 3.87 UIU/ML (ref 0.27–4.2)
UROBILINOGEN UR QL STRIP: ABNORMAL
VLDLC SERPL-MCNC: 15 MG/DL (ref 5–40)
WBC # BLD AUTO: 6.47 10*3/MM3 (ref 3.4–10.8)

## 2021-06-19 PROCEDURE — 80050 GENERAL HEALTH PANEL: CPT

## 2021-06-19 PROCEDURE — 93010 ELECTROCARDIOGRAM REPORT: CPT | Performed by: INTERNAL MEDICINE

## 2021-06-19 PROCEDURE — 93005 ELECTROCARDIOGRAM TRACING: CPT | Performed by: PSYCHIATRY & NEUROLOGY

## 2021-06-19 PROCEDURE — 80178 ASSAY OF LITHIUM: CPT

## 2021-06-19 PROCEDURE — 84436 ASSAY OF TOTAL THYROXINE: CPT

## 2021-06-19 PROCEDURE — 80061 LIPID PANEL: CPT

## 2021-06-19 PROCEDURE — 81003 URINALYSIS AUTO W/O SCOPE: CPT

## 2021-06-19 PROCEDURE — 36415 COLL VENOUS BLD VENIPUNCTURE: CPT

## 2021-06-20 LAB — QT INTERVAL: 441 MS

## 2021-10-27 RX ORDER — CYANOCOBALAMIN 1000 UG/ML
INJECTION, SOLUTION INTRAMUSCULAR; SUBCUTANEOUS
COMMUNITY
End: 2021-11-04

## 2021-10-27 RX ORDER — CYCLOSPORINE 0.5 MG/ML
1 EMULSION OPHTHALMIC EVERY 12 HOURS
COMMUNITY
Start: 2021-05-13 | End: 2022-05-20

## 2021-11-04 ENCOUNTER — OFFICE VISIT (OUTPATIENT)
Dept: CARDIOLOGY | Facility: CLINIC | Age: 31
End: 2021-11-04

## 2021-11-04 VITALS
BODY MASS INDEX: 43.98 KG/M2 | HEART RATE: 81 BPM | OXYGEN SATURATION: 100 % | SYSTOLIC BLOOD PRESSURE: 108 MMHG | WEIGHT: 239 LBS | HEIGHT: 62 IN | DIASTOLIC BLOOD PRESSURE: 75 MMHG

## 2021-11-04 DIAGNOSIS — I10 ESSENTIAL HYPERTENSION: Primary | ICD-10-CM

## 2021-11-04 DIAGNOSIS — E66.01 MORBID OBESITY WITH BMI OF 40.0-44.9, ADULT (HCC): ICD-10-CM

## 2021-11-04 DIAGNOSIS — E78.5 DYSLIPIDEMIA: ICD-10-CM

## 2021-11-04 PROCEDURE — 99214 OFFICE O/P EST MOD 30 MIN: CPT | Performed by: INTERNAL MEDICINE

## 2021-11-04 NOTE — PROGRESS NOTES
Subjective:     Encounter Date:11/04/2021      Patient ID: Sunita Matamoros is a 31 y.o. female.    Chief Complaint : Follow-up for hypertension, dyslipidemia, obesity with BMI over 40  History of Present Illness      This is a 31-year-old  who has PMH of     #. chest pain, history of normal stress  Cardiolite 09/10/2014  #. tachycardia  #. palpitation, normal tsh  #. hypertension since age 16,  workup including abdominal ultrasound for AAA, pheo labs  normal  #. obesity  #. anxiety disorder, rheumatoid arthritis  #.  obesity and polycystic ovarian syndrome  #.  history of syncope, CT PE study, CT head negative, abnormal chest x-ray with interstitial infiltrate with normal BNP level of 50  #  Rheumatoid arthritis  #Cholecystectomy, hiatal hernia repair  #Allergies/intolerance to amoxicillin, penicillin, Carafate     here for   follow-up.  Patient denies any chest pain, shortness of breath, palpitations lightheadedness dizziness loss of consciousness.  Patient had last EKG from 06/29/2017 showed a , today's EKG shows  ..   Patient's blood pressure is 108/75, heart rate 81, O2 sat of 100% on room air.  Reviewed labs done in  08/16/2018 which showed ESR of 17 CMP was unremarkable CBC revealed a hemoglobin of 11.4, previous. Hemoglobin A1c was elevated at 5.7 tsh is normal cholesterol is 2/8 triglycerides 101 HDL is low at 42 LDL is 145, CMP is unremarkable.  repeat labs 9/3/2019 revealed normal CMP, CBC, UA.  Labs from 6/19/2021 revealed cholesterol 124 triglycerides 76, HDL 58, LDL 51, and normal CMP     ASSESSMENT:  #  hypertension  # . obesity BMI over 40/ PCOS  # prolonged QT due to psych meds  #Dyslipidemia     PLAN:  Reviewed lab results with patient  We will continue medical management with rosuvastatin, bisoprolol hydrochlorothiazide and Aldactone to help with hypertension and dyslipidemia as tolerated  Discussed with patient  at length about beta-blockers in pregnancy, patient is on oral  contraceptive pills   advised her to check blood pressure on a regular basis and report if it is elevated I will add amlodipine.  Reviewed BMI over 40   counseled on weight loss diet and exercise  Will monitor QTC with repeat EKG in follow-up  Counseled on diet exercise.  Discussed about COVID-19 vaccination.  Patient already took both the doses.  We will get labs from [facility] office before next visit.          Procedures EKG done 6/19/2021 reviewed/interpreted by me reveals sinus rhythm with rate of 63 bpm with QTC of 452 ms    The following portions of the patient's history were reviewed and updated as appropriate: allergies, current medications, past family history, past medical history, past social history, past surgical history and problem list.    Assessment:         MDM     Diagnosis Plan   1. Essential hypertension     2. Dyslipidemia     3. Morbid obesity with BMI of 40.0-44.9, adult (HCC)            Plan:               Past Medical History:  Past Medical History:   Diagnosis Date   • Asthma    • GERD (gastroesophageal reflux disease)    • Hypertension    • Leaky heart valve    • Migraines    • PCOS (polycystic ovarian syndrome)    • Rheumatoid arteritis (HCC)    • Tachycardia      Past Surgical History:  Past Surgical History:   Procedure Laterality Date   • HIATAL HERNIA REPAIR  12/2018    with transoral fundoplication   • LAPAROSCOPIC CHOLECYSTECTOMY  12/2018      Allergies:  Allergies   Allergen Reactions   • Amoxicillin Hives   • Penicillin G Hives   • Carafate [Sucralfate] Hives     Home Meds:  Current Meds:     Current Outpatient Medications:   •  azelastine (OPTIVAR) 0.05 % ophthalmic solution, AZELASTINE HCL 0.05 % SOLN, Disp: , Rfl:   •  bisoprolol-hydrochlorothiazide (ZIAC) 10-6.25 MG per tablet, TAKE ONE TABLET BY MOUTH DAILY, Disp: 90 tablet, Rfl: 3  •  buPROPion XL (WELLBUTRIN XL) 150 MG 24 hr tablet, BUPROPION HCL ER (XL) 150 MG XR24H-TAB, Disp: , Rfl:   •  cycloSPORINE (Restasis) 0.05 %  "ophthalmic emulsion, Apply 1 drop to eye(s) as directed by provider Every 12 (Twelve) Hours., Disp: , Rfl:   •  diclofenac (VOLTAREN) 1 % gel gel, Apply 4 g topically to the appropriate area as directed 4 (Four) Times a Day As Needed (joint pain)., Disp: 1 tube, Rfl: 2  •  fexofenadine (ALLEGRA) 180 MG tablet, Daily., Disp: , Rfl:   •  hydroxychloroquine (PLAQUENIL) 200 MG tablet, Take 1 tablet by mouth 2 (Two) Times a Day., Disp: 180 tablet, Rfl: 1  •  Latuda 80 MG tablet tablet, , Disp: , Rfl:   •  lithium carbonate 150 MG capsule, , Disp: , Rfl:   •  metFORMIN (GLUCOPHAGE) 500 MG tablet, Take 500 mg by mouth 2 (Two) Times a Day., Disp: , Rfl:   •  rosuvastatin (CRESTOR) 10 MG tablet, , Disp: , Rfl:   •  spironolactone (ALDACTONE) 25 MG tablet, , Disp: , Rfl:   •  Sprintec 28 0.25-35 MG-MCG per tablet, , Disp: , Rfl:   •  sulfaSALAzine (AZULFIDINE) 500 MG tablet, TAKE TWO TABLETS BY MOUTH TWICE A DAY, Disp: 120 tablet, Rfl: 1  •  topiramate (TOPAMAX) 50 MG tablet, TAKE ONE TABLET BY MOUTH EVERY MORNING AND TAKE TWO TABLETS EVERY NIGHT AT BEDTIME, Disp: 90 tablet, Rfl: 1  Social History:   Social History     Tobacco Use   • Smoking status: Never Smoker   • Smokeless tobacco: Never Used   Substance Use Topics   • Alcohol use: Yes     Comment: occ      Family History:  Family History   Problem Relation Age of Onset   • Heart attack Mother    • Heart disease Maternal Grandfather               Review of Systems   Cardiovascular: Negative for chest pain, leg swelling and palpitations.   Respiratory: Negative for shortness of breath.    Neurological: Negative for dizziness and numbness.     All other systems are negative         Objective:     Physical Exam  /75 (BP Location: Left arm, Patient Position: Sitting, Cuff Size: Large Adult)   Pulse 81   Ht 157.5 cm (62\")   Wt 108 kg (239 lb)   SpO2 100%   BMI 43.71 kg/m²   General:  Appears in no acute distress, pleasant obese  Eyes: Sclera is anicteric,  " conjunctiva is clear   HEENT:  No JVD.  No carotid bruits  Respiratory: Respirations regular and unlabored at rest.  Clear to auscultation  Cardiovascular: S1,S2 Regular rate and rhythm. No murmur, rub or gallop auscultated.   Extremities: No digital clubbing or cyanosis, no edema  Skin: Color pink. Skin warm and dry to touch. No rashes  No xanthoma  Neuro: Alert and awake, no lateralizing deficits appreciated    Lab Reviewed:         Puma Butt MD  11/4/2021      Much of the above report is an electronic transcription/translation of the spoken language to printed text using Dragon Software. As such, the subtleties and finesse of the spoken language may permit erroneous, or at times, nonsensical words or phrases to be inadvertently transcribed; thus changes may be made at a later date to rectify these errors.

## 2022-03-30 RX ORDER — BISOPROLOL FUMARATE AND HYDROCHLOROTHIAZIDE 10; 6.25 MG/1; MG/1
TABLET ORAL
Qty: 90 TABLET | Refills: 3 | Status: SHIPPED | OUTPATIENT
Start: 2022-03-30 | End: 2023-02-02

## 2022-03-30 NOTE — TELEPHONE ENCOUNTER
Rx Refill Note  Requested Prescriptions     Pending Prescriptions Disp Refills   • bisoprolol-hydrochlorothiazide (ZIAC) 10-6.25 MG per tablet [Pharmacy Med Name: BISOPROLOL-HCTZ 10-6.25 MG TAB] 90 tablet 3     Sig: TAKE ONE TABLET BY MOUTH DAILY      Last office visit with prescribing clinician: 11/4/2021      Next office visit with prescribing clinician: 11/7/2022            Jaqueline Booker MA  03/30/22, 12:57 EDT

## 2022-06-28 PROCEDURE — U0004 COV-19 TEST NON-CDC HGH THRU: HCPCS | Performed by: NURSE PRACTITIONER

## 2022-11-07 ENCOUNTER — OFFICE VISIT (OUTPATIENT)
Dept: CARDIOLOGY | Facility: CLINIC | Age: 32
End: 2022-11-07

## 2022-11-07 VITALS
DIASTOLIC BLOOD PRESSURE: 68 MMHG | OXYGEN SATURATION: 97 % | SYSTOLIC BLOOD PRESSURE: 115 MMHG | HEIGHT: 62 IN | WEIGHT: 257 LBS | HEART RATE: 80 BPM | BODY MASS INDEX: 47.29 KG/M2

## 2022-11-07 DIAGNOSIS — E78.5 DYSLIPIDEMIA: ICD-10-CM

## 2022-11-07 DIAGNOSIS — E66.01 MORBID OBESITY WITH BMI OF 40.0-44.9, ADULT: ICD-10-CM

## 2022-11-07 DIAGNOSIS — I10 ESSENTIAL HYPERTENSION: Primary | ICD-10-CM

## 2022-11-07 PROCEDURE — 93000 ELECTROCARDIOGRAM COMPLETE: CPT | Performed by: INTERNAL MEDICINE

## 2022-11-07 PROCEDURE — 99214 OFFICE O/P EST MOD 30 MIN: CPT | Performed by: INTERNAL MEDICINE

## 2022-11-07 NOTE — PROGRESS NOTES
Subjective:     Encounter Date:11/07/2022      Patient ID: Sunita Matamoros is a 32 y.o. female.    Chief Complaint : Follow-up for hypertension, dyslipidemia, obesity with BMI over 40    History of Present Illness      Ms. Sunita Matamoros  has PMH of     #. chest pain, history of normal stress  Cardiolite 09/10/2014  #. tachycardia  #. palpitation, normal tsh  #. hypertension since age 16,  workup including abdominal ultrasound for AAA, pheo labs  normal  #. obesity  #. anxiety disorder, rheumatoid arthritis  #.  obesity and polycystic ovarian syndrome  #.  history of syncope, CT PE study, CT head negative, abnormal chest x-ray with interstitial infiltrate with normal BNP level of 50  #  Rheumatoid arthritis  #Cholecystectomy, hiatal hernia repair  #Allergies/intolerance to amoxicillin, penicillin, Carafate     here for   follow-up.  Patient denies any chest pain, shortness of breath, palpitations lightheadedness dizziness loss of consciousness.  Patient had last EKG from 06/29/2017 showed a , today's EKG shows  ..     Patient's arterial blood pressure is 115/68, heart rate 80, O2 sat of 97% on room air.    Reviewed labs done in  08/16/2018 which showed ESR of 17 CMP was unremarkable CBC revealed a hemoglobin of 11.4, previous. Hemoglobin A1c was elevated at 5.7 tsh is normal cholesterol is 2/8 triglycerides 101 HDL is low at 42 LDL is 145, CMP is unremarkable.  repeat labs 9/3/2019 revealed normal CMP, CBC, UA.  Labs from 6/19/2021 revealed cholesterol 124 triglycerides 76, HDL 58, LDL 51, and normal CMP     ASSESSMENT:  #  hypertension  # . obesity BMI over 40/ PCOS  # prolonged QT due to psych meds  #Dyslipidemia     PLAN:    Reviewed EKG results with patient  We will continue medical management with rosuvastatin, bisoprolol hydrochlorothiazide and Aldactone to help with hypertension and dyslipidemia as tolerated  Discussed with patient  at length about beta-blockers in pregnancy, patient is  on oral contraceptive pills   advised her to check blood pressure on a regular basis and report if it is elevated I will add amlodipine.  Reviewed BMI over 40   counseled on weight loss diet and exercise  Will monitor QTC with repeat EKG in follow-up  Counseled on diet exercise.  Will get labs before visit.  Advised patient to stop/decrease salt.              ECG 12 Lead    Date/Time: 11/7/2022 1:41 PM  Performed by: Puma Butt MD  Authorized by: Puma Butt MD   Comparison: compared with previous ECG from 6/19/2021  Comparison to previous ECG: EKG done today reviewed/interpreted by me reveals sinus rhythm with rate of 78 bpm with QT interval of 411 ms and QTC of 444 ms.  Compared to EKG from 6/19/2021 no new change.              Copied text in this portion of the note has been reviewed and is accurate as of 11/7/2022  The following portions of the patient's history were reviewed and updated as appropriate: allergies, current medications, past family history, past medical history, past social history, past surgical history and problem list.    Assessment:         MDM     Diagnosis Plan   1. Essential hypertension  Comprehensive Metabolic Panel    Lipid Panel      2. Dyslipidemia  Comprehensive Metabolic Panel    Lipid Panel      3. Morbid obesity with BMI of 40.0-44.9, adult (HCC)  Comprehensive Metabolic Panel    Lipid Panel             Plan:               Past Medical History:  Past Medical History:   Diagnosis Date   • Asthma    • GERD (gastroesophageal reflux disease)    • Hypertension    • Leaky heart valve    • Migraines    • PCOS (polycystic ovarian syndrome)    • Rheumatoid arteritis (HCC)    • Tachycardia      Past Surgical History:  Past Surgical History:   Procedure Laterality Date   • HIATAL HERNIA REPAIR  12/2018    with transoral fundoplication   • LAPAROSCOPIC CHOLECYSTECTOMY  12/2018      Allergies:  Allergies   Allergen Reactions   • Amoxicillin Hives   • Penicillin G  Hives   • Carafate [Sucralfate] Hives     Home Meds:  Current Meds:     Current Outpatient Medications:   •  azelastine (OPTIVAR) 0.05 % ophthalmic solution, AZELASTINE HCL 0.05 % SOLN, Disp: , Rfl:   •  bisoprolol-hydrochlorothiazide (ZIAC) 10-6.25 MG per tablet, TAKE ONE TABLET BY MOUTH DAILY, Disp: 90 tablet, Rfl: 3  •  buPROPion XL (WELLBUTRIN XL) 150 MG 24 hr tablet, BUPROPION HCL ER (XL) 150 MG XR24H-TAB, Disp: , Rfl:   •  diclofenac (VOLTAREN) 1 % gel gel, Apply 4 g topically to the appropriate area as directed 4 (Four) Times a Day As Needed (joint pain)., Disp: 1 tube, Rfl: 2  •  fexofenadine (ALLEGRA) 180 MG tablet, Daily., Disp: , Rfl:   •  hydroxychloroquine (PLAQUENIL) 200 MG tablet, Take 1 tablet by mouth 2 (Two) Times a Day., Disp: 180 tablet, Rfl: 1  •  lithium 600 MG capsule, , Disp: , Rfl:   •  lithium carbonate 300 MG capsule, , Disp: , Rfl:   •  rosuvastatin (CRESTOR) 10 MG tablet, , Disp: , Rfl:   •  spironolactone (ALDACTONE) 25 MG tablet, , Disp: , Rfl:   •  Sprintec 28 0.25-35 MG-MCG per tablet, , Disp: , Rfl:   •  sulfaSALAzine (AZULFIDINE) 500 MG tablet, TAKE TWO TABLETS BY MOUTH TWICE A DAY, Disp: 120 tablet, Rfl: 1  •  topiramate (TOPAMAX) 50 MG tablet, TAKE ONE TABLET BY MOUTH EVERY MORNING AND TAKE TWO TABLETS EVERY NIGHT AT BEDTIME, Disp: 90 tablet, Rfl: 1  Social History:   Social History     Tobacco Use   • Smoking status: Never   • Smokeless tobacco: Never   Substance Use Topics   • Alcohol use: Yes     Comment: Occasionally      Family History:  Family History   Problem Relation Age of Onset   • Heart attack Mother    • Heart disease Maternal Grandfather               Review of Systems   Constitutional: Negative for malaise/fatigue.   Cardiovascular: Positive for leg swelling. Negative for chest pain and palpitations.   Respiratory: Negative for shortness of breath.    Skin: Negative for rash.   Neurological: Negative for dizziness, light-headedness and numbness.     All other  "systems are negative         Objective:     Physical Exam  /68   Pulse 80   Ht 157.5 cm (62\")   Wt 117 kg (257 lb)   SpO2 97%   BMI 47.01 kg/m²   General:  Appears in no acute distress, pleasant obese  Eyes: Sclera is anicteric,  conjunctiva is clear   HEENT:  No JVD.  No carotid bruits  Respiratory: Respirations regular and unlabored at rest.  Clear to auscultation  Cardiovascular: S1,S2 Regular rate and rhythm. No murmur, rub or gallop auscultated.   Extremities: No digital clubbing or cyanosis, no edema  Skin: Color pink. Skin warm and dry to touch. No rashes  No xanthoma  Neuro: Alert and awake.    Lab Reviewed:         Puma Butt MD  11/7/2022 13:44 EST      [facility] Dragon/Transcription:   \"Dictated utilizing Dragon dictation\".        "

## 2022-11-23 ENCOUNTER — HOSPITAL ENCOUNTER (OUTPATIENT)
Facility: HOSPITAL | Age: 32
Discharge: HOME | End: 2022-11-23

## 2022-11-23 VITALS
SYSTOLIC BLOOD PRESSURE: 136 MMHG | HEIGHT: 62 IN | OXYGEN SATURATION: 98 % | BODY MASS INDEX: 46.01 KG/M2 | WEIGHT: 250 LBS | RESPIRATION RATE: 16 BRPM | TEMPERATURE: 98 F | DIASTOLIC BLOOD PRESSURE: 83 MMHG | HEART RATE: 80 BPM

## 2022-11-23 DIAGNOSIS — J10.1 INFLUENZA A: Primary | ICD-10-CM

## 2022-11-23 LAB
FLUAV SUBTYP SPEC NAA+PROBE: DETECTED
FLUBV RNA ISLT QL NAA+PROBE: NOT DETECTED
SARS-COV-2 RNA RESP QL NAA+PROBE: NOT DETECTED
STREP A PCR: NOT DETECTED

## 2022-11-23 PROCEDURE — G0463 HOSPITAL OUTPT CLINIC VISIT: HCPCS | Performed by: PHYSICIAN ASSISTANT

## 2022-11-23 PROCEDURE — 87651 STREP A DNA AMP PROBE: CPT | Performed by: EMERGENCY MEDICINE

## 2022-11-23 PROCEDURE — 87651 STREP A DNA AMP PROBE: CPT | Performed by: PHYSICIAN ASSISTANT

## 2022-11-23 PROCEDURE — 99214 OFFICE O/P EST MOD 30 MIN: CPT | Performed by: PHYSICIAN ASSISTANT

## 2022-11-23 PROCEDURE — EDLOS: Performed by: PHYSICIAN ASSISTANT

## 2022-11-23 PROCEDURE — 87636 SARSCOV2 & INF A&B AMP PRB: CPT | Performed by: EMERGENCY MEDICINE

## 2022-11-23 PROCEDURE — G0463 HOSPITAL OUTPT CLINIC VISIT: HCPCS | Performed by: EMERGENCY MEDICINE

## 2022-11-23 PROCEDURE — 87636 SARSCOV2 & INF A&B AMP PRB: CPT | Performed by: PHYSICIAN ASSISTANT

## 2022-11-23 RX ORDER — ONDANSETRON 4 MG/1
4 TABLET, ORALLY DISINTEGRATING ORAL EVERY 8 HOURS PRN
Qty: 12 TABLET | Refills: 0 | Status: SHIPPED | OUTPATIENT
Start: 2022-11-23 | End: 2023-03-15

## 2022-11-23 RX ORDER — BENZONATATE 200 MG/1
200 CAPSULE ORAL 3 TIMES DAILY PRN
Qty: 15 CAPSULE | Refills: 0 | Status: SHIPPED | OUTPATIENT
Start: 2022-11-23 | End: 2022-11-30

## 2023-02-02 RX ORDER — BISOPROLOL FUMARATE AND HYDROCHLOROTHIAZIDE 10; 6.25 MG/1; MG/1
TABLET ORAL
Qty: 90 TABLET | Refills: 3 | Status: SHIPPED | OUTPATIENT
Start: 2023-02-02

## 2023-02-02 NOTE — TELEPHONE ENCOUNTER
Rx Refill Note  Requested Prescriptions     Pending Prescriptions Disp Refills   • bisoprolol-hydrochlorothiazide (ZIAC) 10-6.25 MG per tablet [Pharmacy Med Name: BISOPROLOL-HCTZ 10-6.25 MG TAB] 90 tablet 3     Sig: TAKE ONE TABLET BY MOUTH DAILY      Last office visit with prescribing clinician: 11/7/2022   Last telemedicine visit with prescribing clinician: Visit date not found   Next office visit with prescribing clinician: 11/9/2023                         Would you like a call back once the refill request has been completed: [] Yes [] No    If the office needs to give you a call back, can they leave a voicemail: [] Yes [] No    Madeline Smith MA  02/02/23, 09:05 EST

## 2023-02-18 ENCOUNTER — HOSPITAL ENCOUNTER (OUTPATIENT)
Dept: CARDIOLOGY | Facility: HOSPITAL | Age: 33
Discharge: HOME | End: 2023-02-18
Payer: COMMERCIAL

## 2023-02-18 ENCOUNTER — LAB (OUTPATIENT)
Dept: LAB | Facility: HOSPITAL | Age: 33
End: 2023-02-18
Payer: COMMERCIAL

## 2023-02-18 ENCOUNTER — TRANSCRIBE ORDERS (OUTPATIENT)
Dept: LAB | Facility: HOSPITAL | Age: 33
End: 2023-02-18
Payer: COMMERCIAL

## 2023-02-18 DIAGNOSIS — F31.81 BIPOLAR II DISORDER: ICD-10-CM

## 2023-02-18 DIAGNOSIS — Z51.81 ENCOUNTER FOR THERAPEUTIC DRUG MONITORING: ICD-10-CM

## 2023-02-18 DIAGNOSIS — F31.81 BIPOLAR II DISORDER: Primary | ICD-10-CM

## 2023-02-18 LAB
ALBUMIN SERPL-MCNC: 4.1 G/DL (ref 3.5–5.2)
ALBUMIN/GLOB SERPL: 1.5 G/DL
ALP SERPL-CCNC: 97 U/L (ref 39–117)
ALT SERPL W P-5'-P-CCNC: 27 U/L (ref 1–33)
ANION GAP SERPL CALCULATED.3IONS-SCNC: 9 MMOL/L (ref 5–15)
AST SERPL-CCNC: 19 U/L (ref 1–32)
BACTERIA UR QL AUTO: ABNORMAL /HPF
BASOPHILS # BLD AUTO: 0.04 10*3/MM3 (ref 0–0.2)
BASOPHILS NFR BLD AUTO: 0.7 % (ref 0–1.5)
BILIRUB SERPL-MCNC: 0.2 MG/DL (ref 0–1.2)
BILIRUB UR QL STRIP: NEGATIVE
BUN SERPL-MCNC: 13 MG/DL (ref 6–20)
BUN/CREAT SERPL: 14.1 (ref 7–25)
CALCIUM SPEC-SCNC: 9.1 MG/DL (ref 8.6–10.5)
CHLORIDE SERPL-SCNC: 107 MMOL/L (ref 98–107)
CHOLEST SERPL-MCNC: 133 MG/DL (ref 0–200)
CLARITY UR: ABNORMAL
CO2 SERPL-SCNC: 22 MMOL/L (ref 22–29)
COLOR UR: ABNORMAL
CREAT SERPL-MCNC: 0.92 MG/DL (ref 0.57–1)
DEPRECATED RDW RBC AUTO: 42.5 FL (ref 37–54)
EGFRCR SERPLBLD CKD-EPI 2021: 84 ML/MIN/1.73
EOSINOPHIL # BLD AUTO: 0.12 10*3/MM3 (ref 0–0.4)
EOSINOPHIL NFR BLD AUTO: 2.2 % (ref 0.3–6.2)
ERYTHROCYTE [DISTWIDTH] IN BLOOD BY AUTOMATED COUNT: 13.1 % (ref 12.3–15.4)
GLOBULIN UR ELPH-MCNC: 2.7 GM/DL
GLUCOSE SERPL-MCNC: 94 MG/DL (ref 65–99)
GLUCOSE UR STRIP-MCNC: NEGATIVE MG/DL
HCT VFR BLD AUTO: 39.4 % (ref 34–46.6)
HDLC SERPL-MCNC: 55 MG/DL (ref 40–60)
HGB BLD-MCNC: 13 G/DL (ref 12–15.9)
HGB UR QL STRIP.AUTO: NEGATIVE
HYALINE CASTS UR QL AUTO: ABNORMAL /LPF
IMM GRANULOCYTES # BLD AUTO: 0.01 10*3/MM3 (ref 0–0.05)
IMM GRANULOCYTES NFR BLD AUTO: 0.2 % (ref 0–0.5)
KETONES UR QL STRIP: ABNORMAL
LDLC SERPL CALC-MCNC: 60 MG/DL (ref 0–100)
LDLC/HDLC SERPL: 1.08 {RATIO}
LEUKOCYTE ESTERASE UR QL STRIP.AUTO: NEGATIVE
LITHIUM SERPL-SCNC: 0.7 MMOL/L (ref 0.6–1.2)
LYMPHOCYTES # BLD AUTO: 1.1 10*3/MM3 (ref 0.7–3.1)
LYMPHOCYTES NFR BLD AUTO: 20 % (ref 19.6–45.3)
MCH RBC QN AUTO: 29.3 PG (ref 26.6–33)
MCHC RBC AUTO-ENTMCNC: 33 G/DL (ref 31.5–35.7)
MCV RBC AUTO: 88.7 FL (ref 79–97)
MONOCYTES # BLD AUTO: 0.37 10*3/MM3 (ref 0.1–0.9)
MONOCYTES NFR BLD AUTO: 6.7 % (ref 5–12)
MUCOUS THREADS URNS QL MICRO: ABNORMAL /HPF
NEUTROPHILS NFR BLD AUTO: 3.85 10*3/MM3 (ref 1.7–7)
NEUTROPHILS NFR BLD AUTO: 70.2 % (ref 42.7–76)
NITRITE UR QL STRIP: NEGATIVE
NRBC BLD AUTO-RTO: 0 /100 WBC (ref 0–0.2)
PH UR STRIP.AUTO: 6 [PH] (ref 5–8)
PLATELET # BLD AUTO: 310 10*3/MM3 (ref 140–450)
PMV BLD AUTO: 11 FL (ref 6–12)
POTASSIUM SERPL-SCNC: 3.7 MMOL/L (ref 3.5–5.2)
PROT SERPL-MCNC: 6.8 G/DL (ref 6–8.5)
PROT UR QL STRIP: ABNORMAL
RBC # BLD AUTO: 4.44 10*6/MM3 (ref 3.77–5.28)
RBC # UR STRIP: ABNORMAL /HPF
REF LAB TEST METHOD: ABNORMAL
SODIUM SERPL-SCNC: 138 MMOL/L (ref 136–145)
SP GR UR STRIP: >=1.03 (ref 1–1.03)
SQUAMOUS #/AREA URNS HPF: ABNORMAL /HPF
T4 SERPL-MCNC: 8.68 MCG/DL (ref 4.5–11.7)
TRIGL SERPL-MCNC: 94 MG/DL (ref 0–150)
TSH SERPL DL<=0.05 MIU/L-ACNC: 5.32 UIU/ML (ref 0.27–4.2)
UROBILINOGEN UR QL STRIP: ABNORMAL
VLDLC SERPL-MCNC: 18 MG/DL (ref 5–40)
WBC # UR STRIP: ABNORMAL /HPF
WBC NRBC COR # BLD: 5.49 10*3/MM3 (ref 3.4–10.8)

## 2023-02-18 PROCEDURE — 93010 ELECTROCARDIOGRAM REPORT: CPT | Performed by: INTERNAL MEDICINE

## 2023-02-18 PROCEDURE — 80178 ASSAY OF LITHIUM: CPT

## 2023-02-18 PROCEDURE — 93005 ELECTROCARDIOGRAM TRACING: CPT | Performed by: PSYCHIATRY & NEUROLOGY

## 2023-02-18 PROCEDURE — 80050 GENERAL HEALTH PANEL: CPT

## 2023-02-18 PROCEDURE — 80061 LIPID PANEL: CPT

## 2023-02-18 PROCEDURE — 81001 URINALYSIS AUTO W/SCOPE: CPT

## 2023-02-18 PROCEDURE — 84436 ASSAY OF TOTAL THYROXINE: CPT

## 2023-02-18 PROCEDURE — 36415 COLL VENOUS BLD VENIPUNCTURE: CPT

## 2023-02-27 LAB — QT INTERVAL: 459 MS

## 2023-03-11 ENCOUNTER — APPOINTMENT (OUTPATIENT)
Dept: GENERAL RADIOLOGY | Facility: HOSPITAL | Age: 33
End: 2023-03-11
Payer: COMMERCIAL

## 2023-03-11 ENCOUNTER — HOSPITAL ENCOUNTER (EMERGENCY)
Facility: HOSPITAL | Age: 33
Discharge: HOME | End: 2023-03-12
Attending: EMERGENCY MEDICINE | Admitting: EMERGENCY MEDICINE
Payer: COMMERCIAL

## 2023-03-11 DIAGNOSIS — R07.89 ATYPICAL CHEST PAIN: Primary | ICD-10-CM

## 2023-03-11 PROCEDURE — 71045 X-RAY EXAM CHEST 1 VIEW: CPT

## 2023-03-11 PROCEDURE — 99284 EMERGENCY DEPT VISIT MOD MDM: CPT

## 2023-03-11 PROCEDURE — 93010 ELECTROCARDIOGRAM REPORT: CPT | Performed by: EMERGENCY MEDICINE

## 2023-03-11 PROCEDURE — 93005 ELECTROCARDIOGRAM TRACING: CPT | Performed by: EMERGENCY MEDICINE

## 2023-03-11 RX ORDER — SODIUM CHLORIDE 0.9 % (FLUSH) 0.9 %
10 SYRINGE (ML) INJECTION AS NEEDED
Status: DISCONTINUED | OUTPATIENT
Start: 2023-03-11 | End: 2023-03-12 | Stop reason: HOSPADM

## 2023-03-12 VITALS
RESPIRATION RATE: 18 BRPM | HEART RATE: 74 BPM | HEIGHT: 62 IN | BODY MASS INDEX: 45.08 KG/M2 | OXYGEN SATURATION: 97 % | SYSTOLIC BLOOD PRESSURE: 125 MMHG | DIASTOLIC BLOOD PRESSURE: 81 MMHG | WEIGHT: 245 LBS | TEMPERATURE: 97.5 F

## 2023-03-12 LAB
ALBUMIN SERPL-MCNC: 3.9 G/DL (ref 3.5–5.2)
ALBUMIN/GLOB SERPL: 1.4 G/DL
ALP SERPL-CCNC: 95 U/L (ref 39–117)
ALT SERPL W P-5'-P-CCNC: 29 U/L (ref 1–33)
ANION GAP SERPL CALCULATED.3IONS-SCNC: 9.4 MMOL/L (ref 5–15)
AST SERPL-CCNC: 23 U/L (ref 1–32)
BASOPHILS # BLD AUTO: 0.02 10*3/MM3 (ref 0–0.2)
BASOPHILS NFR BLD AUTO: 0.5 % (ref 0–1.5)
BILIRUB SERPL-MCNC: 0.2 MG/DL (ref 0–1.2)
BUN SERPL-MCNC: 20 MG/DL (ref 6–20)
BUN/CREAT SERPL: 21.3 (ref 7–25)
CALCIUM SPEC-SCNC: 8.6 MG/DL (ref 8.6–10.5)
CHLORIDE SERPL-SCNC: 107 MMOL/L (ref 98–107)
CO2 SERPL-SCNC: 21.6 MMOL/L (ref 22–29)
CREAT SERPL-MCNC: 0.94 MG/DL (ref 0.57–1)
D DIMER PPP FEU-MCNC: 0.4 MCGFEU/ML (ref 0–0.5)
DEPRECATED RDW RBC AUTO: 45.1 FL (ref 37–54)
EGFRCR SERPLBLD CKD-EPI 2021: 81.8 ML/MIN/1.73
EOSINOPHIL # BLD AUTO: 0.06 10*3/MM3 (ref 0–0.4)
EOSINOPHIL NFR BLD AUTO: 1.5 % (ref 0.3–6.2)
ERYTHROCYTE [DISTWIDTH] IN BLOOD BY AUTOMATED COUNT: 13.3 % (ref 12.3–15.4)
GEN 5 2HR TROPONIN T REFLEX: <6 NG/L
GLOBULIN UR ELPH-MCNC: 2.7 GM/DL
GLUCOSE SERPL-MCNC: 104 MG/DL (ref 65–99)
HCG SERPL QL: NEGATIVE
HCT VFR BLD AUTO: 38 % (ref 34–46.6)
HGB BLD-MCNC: 12.2 G/DL (ref 12–15.9)
IMM GRANULOCYTES # BLD AUTO: 0.01 10*3/MM3 (ref 0–0.05)
IMM GRANULOCYTES NFR BLD AUTO: 0.3 % (ref 0–0.5)
LYMPHOCYTES # BLD AUTO: 1.44 10*3/MM3 (ref 0.7–3.1)
LYMPHOCYTES NFR BLD AUTO: 36.5 % (ref 19.6–45.3)
MCH RBC QN AUTO: 29.2 PG (ref 26.6–33)
MCHC RBC AUTO-ENTMCNC: 32.1 G/DL (ref 31.5–35.7)
MCV RBC AUTO: 90.9 FL (ref 79–97)
MONOCYTES # BLD AUTO: 0.29 10*3/MM3 (ref 0.1–0.9)
MONOCYTES NFR BLD AUTO: 7.4 % (ref 5–12)
NEUTROPHILS NFR BLD AUTO: 2.12 10*3/MM3 (ref 1.7–7)
NEUTROPHILS NFR BLD AUTO: 53.8 % (ref 42.7–76)
PLATELET # BLD AUTO: 258 10*3/MM3 (ref 140–450)
PMV BLD AUTO: 10.9 FL (ref 6–12)
POTASSIUM SERPL-SCNC: 3.5 MMOL/L (ref 3.5–5.2)
PROT SERPL-MCNC: 6.6 G/DL (ref 6–8.5)
QT INTERVAL: 421 MS
RBC # BLD AUTO: 4.18 10*6/MM3 (ref 3.77–5.28)
SODIUM SERPL-SCNC: 138 MMOL/L (ref 136–145)
TROPONIN T DELTA: NORMAL
TROPONIN T SERPL HS-MCNC: <6 NG/L
WBC NRBC COR # BLD: 3.94 10*3/MM3 (ref 3.4–10.8)

## 2023-03-12 PROCEDURE — 25010000002 KETOROLAC TROMETHAMINE PER 15 MG: Performed by: EMERGENCY MEDICINE

## 2023-03-12 PROCEDURE — 96374 THER/PROPH/DIAG INJ IV PUSH: CPT

## 2023-03-12 PROCEDURE — 84484 ASSAY OF TROPONIN QUANT: CPT | Performed by: EMERGENCY MEDICINE

## 2023-03-12 PROCEDURE — 36415 COLL VENOUS BLD VENIPUNCTURE: CPT

## 2023-03-12 PROCEDURE — 85025 COMPLETE CBC W/AUTO DIFF WBC: CPT | Performed by: EMERGENCY MEDICINE

## 2023-03-12 PROCEDURE — 84703 CHORIONIC GONADOTROPIN ASSAY: CPT | Performed by: EMERGENCY MEDICINE

## 2023-03-12 PROCEDURE — 80053 COMPREHEN METABOLIC PANEL: CPT | Performed by: EMERGENCY MEDICINE

## 2023-03-12 PROCEDURE — 99283 EMERGENCY DEPT VISIT LOW MDM: CPT | Performed by: EMERGENCY MEDICINE

## 2023-03-12 PROCEDURE — 85379 FIBRIN DEGRADATION QUANT: CPT | Performed by: EMERGENCY MEDICINE

## 2023-03-12 RX ORDER — NAPROXEN 500 MG/1
500 TABLET ORAL 2 TIMES DAILY PRN
Qty: 15 TABLET | Refills: 0 | Status: SHIPPED | OUTPATIENT
Start: 2023-03-12 | End: 2023-03-15

## 2023-03-12 RX ORDER — ASPIRIN 81 MG/1
324 TABLET, CHEWABLE ORAL ONCE
Status: COMPLETED | OUTPATIENT
Start: 2023-03-12 | End: 2023-03-12

## 2023-03-12 RX ORDER — KETOROLAC TROMETHAMINE 15 MG/ML
15 INJECTION, SOLUTION INTRAMUSCULAR; INTRAVENOUS ONCE
Status: COMPLETED | OUTPATIENT
Start: 2023-03-12 | End: 2023-03-12

## 2023-03-12 RX ADMIN — ASPIRIN 81 MG CHEWABLE TABLET 324 MG: 81 TABLET CHEWABLE at 00:39

## 2023-03-12 RX ADMIN — KETOROLAC TROMETHAMINE 15 MG: 15 INJECTION, SOLUTION INTRAMUSCULAR; INTRAVENOUS at 01:08

## 2023-03-12 NOTE — FSED PROVIDER NOTE
Subjective   History of Present Illness  33 yof complains of left sided chest and arm pain for 1 week. Pt reports it comes and goes but notes it is worse when she moves her left arm. Today the pain has been constant since she woke up. PT denies shortness of breath, she has had a cough, no fever, no nausea or vomiting. No recent travel or history of sick contacts.         Review of Systems   Constitutional: Negative.    HENT: Negative.    Eyes: Negative.    Respiratory: Positive for cough.    Cardiovascular: Positive for chest pain. Negative for palpitations and leg swelling.   Gastrointestinal: Negative.    Genitourinary: Negative.    Musculoskeletal: Negative.    Skin: Negative.    Neurological: Negative.    All other systems reviewed and are negative.      Past Medical History:   Diagnosis Date   • Asthma    • GERD (gastroesophageal reflux disease)    • Hypertension    • Leaky heart valve    • Migraines    • PCOS (polycystic ovarian syndrome)    • Rheumatoid arteritis (HCC)    • Tachycardia        Allergies   Allergen Reactions   • Amoxicillin Hives   • Penicillin G Hives   • Carafate [Sucralfate] Hives       Past Surgical History:   Procedure Laterality Date   • HIATAL HERNIA REPAIR  12/2018    with transoral fundoplication   • LAPAROSCOPIC CHOLECYSTECTOMY  12/2018       Family History   Problem Relation Age of Onset   • Heart attack Mother    • Heart disease Maternal Grandfather        Social History     Socioeconomic History   • Marital status: Significant Other   • Number of children: 2   Tobacco Use   • Smoking status: Never   • Smokeless tobacco: Never   Vaping Use   • Vaping Use: Never used   Substance and Sexual Activity   • Alcohol use: Yes     Comment: Occasionally   • Drug use: No   • Sexual activity: Defer           Objective   Physical Exam  Vitals reviewed.   Constitutional:       General: She is not in acute distress.     Appearance: She is well-developed. She is obese. She is not ill-appearing,  toxic-appearing or diaphoretic.   HENT:      Head: Normocephalic and atraumatic.   Eyes:      Extraocular Movements: Extraocular movements intact.      Pupils: Pupils are equal, round, and reactive to light.   Neck:      Vascular: No JVD.   Cardiovascular:      Rate and Rhythm: Normal rate and regular rhythm.      Heart sounds: Normal heart sounds. No murmur heard.  Pulmonary:      Effort: Pulmonary effort is normal.      Breath sounds: Normal breath sounds.   Abdominal:      Palpations: Abdomen is soft.      Tenderness: There is no abdominal tenderness.   Musculoskeletal:         General: Normal range of motion.      Cervical back: Normal range of motion and neck supple.   Skin:     General: Skin is warm and dry.      Capillary Refill: Capillary refill takes less than 2 seconds.   Neurological:      General: No focal deficit present.      Mental Status: She is alert.         ECG 12 Lead      Date/Time: 3/11/2023 11:42 PM  Performed by: Lorenza Awad MD  Authorized by: Lorenza Awad MD   Interpreted by physician  Comparison: compared with previous ECG from 6/20/2021  Similar to previous ECG  Rhythm: sinus rhythm  Rate: normal  ST Flattening: V2, V3, V4, V5 and V6  Clinical impression: non-specific ECG and low voltage                 ED Course         Pt pain improved with Toradol             HEART Score: 2                      Medical Decision Making  Exam without evidence of volume overload so doubt heart failure. EKG without signs of active ischemia. Given the timing of pain to ER presentation,delta troponin was 0 so doubt NSTEMI. Presentation not consistent with acute PE (d-dimer negative), pneumothorax (not visualized on chest xr), thoracic aortic dissection, pericarditis, tamponade, pneumonia (no infectious symptoms, clear chest xr), myocarditis (no recent illness, neg trop). HEART score: 2 so plant to discharge patient home with PMD follow up.    Atypical chest pain: complicated acute illness or  injury  Amount and/or Complexity of Data Reviewed  Labs: ordered.  Radiology: ordered.  ECG/medicine tests: ordered and independent interpretation performed.      Risk  OTC drugs.  Prescription drug management.          Final diagnoses:   Atypical chest pain       ED Disposition  ED Disposition     ED Disposition   Discharge    Condition   Stable    Comment   --             Blaire Olivas MD  [id number] [facility] [place] [address] [phone]    In 2 days           Medication List      New Prescriptions    naproxen 500 MG EC tablet  Commonly known as: EC NAPROSYN  Take 1 tablet by mouth 2 (Two) Times a Day As Needed (pain).           Where to Get Your Medications      These medications were sent to [facility] [id number] - [place], [place] - [address] [place] [address] [address]  - [phone]  - [phone] [address] [place] [address]    Phone: [phone]   · naproxen 500 MG EC tablet

## 2023-03-13 ENCOUNTER — TELEPHONE (OUTPATIENT)
Dept: CARDIOLOGY | Facility: CLINIC | Age: 33
End: 2023-03-13
Payer: COMMERCIAL

## 2023-03-13 NOTE — TELEPHONE ENCOUNTER
Went to ER over weekend and had EKG. Results in chart say it was abnormal. Patient wants Dr. Butt to review it.

## 2023-03-14 NOTE — PROGRESS NOTES
Subjective:     Encounter Date:03/15/2023      Patient ID: Sunita Matamoros is a 33 y.o. female.    Chief Complaint: Follow-up from ED for Chest pain, abnormal EKG     History of Present Illness     Ms. Sunita Matamoros  has PMH of      #. chest pain, history of normal stress  Cardiolite 09/10/2014  #. tachycardia  #. palpitation, normal tsh  #. hypertension since age 16,  workup including abdominal ultrasound for AAA, pheo labs  normal  #. obesity  #. anxiety disorder, rheumatoid arthritis  #.  obesity and polycystic ovarian syndrome  #.  history of syncope, CT PE study, CT head negative, abnormal chest x-ray with interstitial infiltrate with normal BNP level of 50  #  Rheumatoid arthritis  #Cholecystectomy, hiatal hernia repair  #Allergies/intolerance to amoxicillin, penicillin, Carafate      Here for follow up from ED visit and abnormal EKG.  Patient was seen at the free standing ED at [facility].  Work up there was unremarkable.  EKG showed some QTC prolongation of 506.  Patient reports multiple symptoms including chest pain, shortness of breath, fatigue, palpitations lightheadedness and lower extremity edema.  Patient was recently started on Geodon and feels like the symptoms may be caused from that.  During examination she is very anxious and fast speaking but very pleasant and concerned with her health.      Blood pressure today is 134/84 heart rate 87 oxygen 98% on room air her BMI is 46.6        Lab Review:   3/12/2023: labs including CMP, CBC and high sensitivity troponin all negative       The following portions of the patient's history were reviewed and updated as appropriate: allergies, current medications, past family history, past medical history, past social history, past surgical history and problem list.           Review of Systems   Constitutional: Positive for malaise/fatigue. Negative for fever.   Cardiovascular: Positive for chest pain, dyspnea on exertion, leg swelling and  palpitations.   Respiratory: Negative for cough and shortness of breath.    Musculoskeletal: Positive for joint pain.   Gastrointestinal: Negative for abdominal pain, nausea and vomiting.   Neurological: Positive for light-headedness and numbness. Negative for dizziness, focal weakness and headaches.   Psychiatric/Behavioral: The patient is nervous/anxious.    All other systems reviewed and are negative.    Past Medical History:   Diagnosis Date   • Abnormal ECG 03/11/2023   • Asthma    • GERD (gastroesophageal reflux disease)    • Heart murmur     Closed up had when i was a kid   • Hyperlipidemia     On meds for   • Hypertension    • Leaky heart valve    • Migraines    • PCOS (polycystic ovarian syndrome)    • Rheumatoid arteritis (HCC)    • Tachycardia    • Type 2 diabetes mellitus without complication (HCC) 2/26/2019     Past Surgical History:   Procedure Laterality Date   • HIATAL HERNIA REPAIR  12/2018    with transoral fundoplication   • LAPAROSCOPIC CHOLECYSTECTOMY  12/2018     /84   Pulse 87   Wt 116 kg (255 lb)   LMP 03/11/2023 (Exact Date)   SpO2 98%   BMI 46.64 kg/m²   Family History   Problem Relation Age of Onset   • Heart attack Mother    • Heart disease Maternal Grandfather    • Heart failure Maternal Grandfather    • Heart failure Maternal Grandmother        Current Outpatient Medications:   •  azelastine (OPTIVAR) 0.05 % ophthalmic solution, AZELASTINE HCL 0.05 % SOLN, Disp: , Rfl:   •  bisoprolol-hydrochlorothiazide (ZIAC) 10-6.25 MG per tablet, TAKE ONE TABLET BY MOUTH DAILY, Disp: 90 tablet, Rfl: 3  •  buPROPion XL (WELLBUTRIN XL) 150 MG 24 hr tablet, BUPROPION HCL ER (XL) 150 MG XR24H-TAB, Disp: , Rfl:   •  diclofenac (VOLTAREN) 1 % gel gel, Apply 4 g topically to the appropriate area as directed 4 (Four) Times a Day As Needed (joint pain)., Disp: 1 tube, Rfl: 2  •  fexofenadine (ALLEGRA) 180 MG tablet, Daily., Disp: , Rfl:   •  hydroxychloroquine (PLAQUENIL) 200 MG tablet, Take 1  tablet by mouth 2 (Two) Times a Day., Disp: 180 tablet, Rfl: 1  •  rosuvastatin (CRESTOR) 10 MG tablet, , Disp: , Rfl:   •  Sprintec 28 0.25-35 MG-MCG per tablet, , Disp: , Rfl:   •  sulfaSALAzine (AZULFIDINE) 500 MG tablet, TAKE TWO TABLETS BY MOUTH TWICE A DAY, Disp: 120 tablet, Rfl: 1  •  topiramate (TOPAMAX) 50 MG tablet, TAKE ONE TABLET BY MOUTH EVERY MORNING AND TAKE TWO TABLETS EVERY NIGHT AT BEDTIME, Disp: 90 tablet, Rfl: 1  •  ziprasidone (GEODON) 20 MG capsule, Take 1 capsule by mouth Every Night., Disp: , Rfl:   •  spironolactone (Aldactone) 50 MG tablet, Take 1 tablet by mouth Daily., Disp: 30 tablet, Rfl: 3  Allergies   Allergen Reactions   • Amoxicillin Hives   • Penicillin G Hives   • Penicillins Hives   • Carafate [Sucralfate] Hives     Social History     Socioeconomic History   • Marital status: Significant Other   • Number of children: 2   Tobacco Use   • Smoking status: Never   • Smokeless tobacco: Never   Vaping Use   • Vaping Use: Never used   Substance and Sexual Activity   • Alcohol use: Yes     Comment: Occasionally   • Drug use: No   • Sexual activity: Not Currently     Partners: Male     Birth control/protection: Birth control pill                Objective:     Vitals reviewed.   Constitutional:       Appearance: Healthy appearance. Not in distress.   Neck:      Vascular: No JVR. JVD normal.   Pulmonary:      Effort: Pulmonary effort is normal.      Breath sounds: Normal breath sounds. No wheezing. No rhonchi. No rales.   Chest:      Chest wall: Not tender to palpatation.   Cardiovascular:      PMI at left midclavicular line. Normal rate. Regular rhythm. Normal S1. Normal S2.      Murmurs: There is no murmur.      No gallop. No click. No rub.   Pulses:     Intact distal pulses.   Edema:     Peripheral edema absent.   Abdominal:      General: Bowel sounds are normal.      Palpations: Abdomen is soft.      Tenderness: There is no abdominal tenderness.   Musculoskeletal: Normal range of  motion.         General: No tenderness. Skin:     General: Skin is warm and dry.   Neurological:      General: No focal deficit present.      Mental Status: Alert and oriented to person, place and time.   Psychiatric:         Mood and Affect: Mood is anxious.         Speech: Speech is rapid and pressured.         ECG 12 Lead    Date/Time: 3/15/2023 3:37 PM  Performed by: Ruthie Meraz APRN  Authorized by: Ruthie Meraz APRN   Comparison: compared with previous ECG from 3/11/2023  Similar to previous ECG  Comparison to previous ECG: QTC previously 506  Rhythm: sinus rhythm  Rate: normal  BPM: 83  T inversion: III  Other findings: T wave abnormality    Clinical impression: abnormal EKG  Comments: QTc prolongation 488                           Assessment:     [facility]     Diagnosis Plan   1. Precordial pain  Adult Transthoracic Echo Complete W/ Cont if Necessary Per Protocol    Stress Test With Myocardial Perfusion One Day    ECG 12 Lead      2. Abnormal EKG  Adult Transthoracic Echo Complete W/ Cont if Necessary Per Protocol    Stress Test With Myocardial Perfusion One Day    ECG 12 Lead      3. Primary hypertension        4. Heart valve disorder        5. Obesity with body mass index 30 or greater                       Plan:   Chart and labs reviewed from ED visit  Lab work normal   EKG today unremarkable except for abnormal QTc 488 previously 506 on the ED EKG  Chest x-ray showed questionable possible edema.  Patient reports some trace edema in lower extremities however it is usually improved when she props them up at night.    Continue bisoprolol hydrochlorothiazide 10- 6.25  Advised patient to discontinue naproxen secondary to edema    Increase spironolactone to 50 mg daily to help with fluid retention  Advised patient to limit salt intake    Will check stress Myoview to rule out any heart disease.  Patient is uncertain if she can walk on the treadmill as she does have dyspnea on exertion.   Check echocardiogram  "assess valvular disease.  Patient reports having some mild \"leaky valve\" I cannot find previous echocardiogram in our records to confirm.    Did advise patient that a lot of her symptoms could be due to anxiety follow-up with psychiatrist/behavioral health regarding possibly changing Geodon to something else.       We will follow-up after stress test and echocardiogram.  Advised patient that although her EKG is \"abnormal\" this has been her baseline EKG for a few years    Electronically signed by ALFIE Wolfe, 03/17/23, 4:23 PM EDT.            This document is intended for medical expert use only.  Reading of this document by patients and/or patient's family without participating medical staff guidance may result in misinterpretation and unintended morbidity. Any interpretation of such data is the responsibility of the patient and/or family member responsible for the patient in concert with their primary or specialist providers, not to be left for sources of online search as such as [employer], Valentin Uzhun or similar queries.  Relying on these approaches to knowledge may result in misinterpretation, misguided goals of care and even death should patient or family members try recommendations outside of the realm of professional medical care in a supervised inpatient environment.    "

## 2023-03-15 ENCOUNTER — OFFICE VISIT (OUTPATIENT)
Dept: CARDIOLOGY | Facility: CLINIC | Age: 33
End: 2023-03-15
Payer: COMMERCIAL

## 2023-03-15 VITALS
SYSTOLIC BLOOD PRESSURE: 134 MMHG | BODY MASS INDEX: 46.64 KG/M2 | OXYGEN SATURATION: 98 % | DIASTOLIC BLOOD PRESSURE: 84 MMHG | WEIGHT: 255 LBS | HEART RATE: 87 BPM

## 2023-03-15 DIAGNOSIS — R07.2 PRECORDIAL PAIN: Primary | ICD-10-CM

## 2023-03-15 DIAGNOSIS — I10 PRIMARY HYPERTENSION: ICD-10-CM

## 2023-03-15 DIAGNOSIS — I38 HEART VALVE DISORDER: ICD-10-CM

## 2023-03-15 DIAGNOSIS — E66.9 OBESITY WITH BODY MASS INDEX 30 OR GREATER: ICD-10-CM

## 2023-03-15 DIAGNOSIS — R94.31 ABNORMAL EKG: ICD-10-CM

## 2023-03-15 PROBLEM — E28.2 POLYCYSTIC OVARY SYNDROME: Status: ACTIVE | Noted: 2020-03-06

## 2023-03-15 PROBLEM — E11.9 TYPE 2 DIABETES MELLITUS WITHOUT COMPLICATION: Status: ACTIVE | Noted: 2019-02-26

## 2023-03-15 PROBLEM — Z79.899 LONG-TERM CURRENT USE OF DRUG THERAPY FOR ATTENTION DEFICIT HYPERACTIVITY DISORDER (ADHD): Status: ACTIVE | Noted: 2018-11-20

## 2023-03-15 PROBLEM — E28.2 POLYCYSTIC OVARIAN DISEASE: Status: ACTIVE | Noted: 2019-06-25

## 2023-03-15 PROBLEM — H47.323 OPTIC NERVE DRUSEN, BILATERAL: Status: ACTIVE | Noted: 2017-11-09

## 2023-03-15 PROBLEM — L65.8: Status: ACTIVE | Noted: 2020-03-06

## 2023-03-15 PROBLEM — H04.129 TEAR FILM INSUFFICIENCY: Status: ACTIVE | Noted: 2018-02-07

## 2023-03-15 PROBLEM — M35.01 KERATOCONJUNCTIVITIS SICCA (HCC): Status: ACTIVE | Noted: 2021-05-13

## 2023-03-15 PROBLEM — H52.10 MYOPIA: Status: ACTIVE | Noted: 2018-11-20

## 2023-03-15 PROBLEM — F90.9 LONG-TERM CURRENT USE OF DRUG THERAPY FOR ATTENTION DEFICIT HYPERACTIVITY DISORDER (ADHD): Status: ACTIVE | Noted: 2018-11-20

## 2023-03-15 PROBLEM — H16.9 KERATITIS: Status: ACTIVE | Noted: 2017-11-09

## 2023-03-15 PROBLEM — Z79.899 OTHER LONG TERM (CURRENT) DRUG THERAPY: Status: ACTIVE | Noted: 2017-11-09

## 2023-03-15 PROBLEM — G43.909 MIGRAINE: Status: ACTIVE | Noted: 2019-06-25

## 2023-03-15 PROBLEM — H16.229 KERATOCONJUNCTIVITIS SICCA NOT SPECIFIED AS SJOGREN'S: Status: ACTIVE | Noted: 2017-11-09

## 2023-03-15 PROBLEM — E78.2 MIXED HYPERLIPIDEMIA: Status: ACTIVE | Noted: 2019-07-24

## 2023-03-15 RX ORDER — ZIPRASIDONE HYDROCHLORIDE 20 MG/1
20 CAPSULE ORAL NIGHTLY
COMMUNITY

## 2023-03-15 RX ORDER — ZIPRASIDONE MESYLATE 20 MG/ML
20 INJECTION, POWDER, LYOPHILIZED, FOR SOLUTION INTRAMUSCULAR ONCE
COMMUNITY
End: 2023-03-15 | Stop reason: SDUPTHER

## 2023-03-15 RX ORDER — SPIRONOLACTONE 50 MG/1
50 TABLET, FILM COATED ORAL DAILY
Qty: 30 TABLET | Refills: 3 | Status: SHIPPED | OUTPATIENT
Start: 2023-03-15

## 2023-04-07 ENCOUNTER — HOSPITAL ENCOUNTER (OUTPATIENT)
Dept: CARDIOLOGY | Facility: HOSPITAL | Age: 33
Discharge: HOME | End: 2023-04-07
Payer: COMMERCIAL

## 2023-04-07 ENCOUNTER — TELEPHONE (OUTPATIENT)
Dept: CARDIOLOGY | Facility: CLINIC | Age: 33
End: 2023-04-07
Payer: COMMERCIAL

## 2023-04-07 DIAGNOSIS — R07.2 PRECORDIAL PAIN: ICD-10-CM

## 2023-04-07 DIAGNOSIS — R94.31 ABNORMAL EKG: ICD-10-CM

## 2023-04-07 LAB
BH CV REST NUCLEAR ISOTOPE DOSE: 11.1 MCI
BH CV STRESS BP STAGE 1: NORMAL
BH CV STRESS COMMENTS STAGE 1: NORMAL
BH CV STRESS DOSE REGADENOSON STAGE 1: 0.4
BH CV STRESS DURATION MIN STAGE 1: 0
BH CV STRESS DURATION SEC STAGE 1: 10
BH CV STRESS GRADE STAGE 1: 10
BH CV STRESS HR STAGE 1: 68
BH CV STRESS METS STAGE 1: 5
BH CV STRESS NUCLEAR ISOTOPE DOSE: 30.7 MCI
BH CV STRESS PROTOCOL 1: NORMAL
BH CV STRESS RECOVERY BP: NORMAL MMHG
BH CV STRESS RECOVERY HR: 120 BPM
BH CV STRESS SPEED STAGE 1: 1.7
BH CV STRESS STAGE 1: 1
MAXIMAL PREDICTED HEART RATE: 186 BPM
STRESS BASELINE BP: NORMAL MMHG
STRESS BASELINE HR: 68 BPM
STRESS TARGET HR: 158 BPM

## 2023-04-07 PROCEDURE — 78452 HT MUSCLE IMAGE SPECT MULT: CPT | Performed by: INTERNAL MEDICINE

## 2023-04-07 PROCEDURE — A9500 TC99M SESTAMIBI: HCPCS | Performed by: NURSE PRACTITIONER

## 2023-04-07 PROCEDURE — 78452 HT MUSCLE IMAGE SPECT MULT: CPT

## 2023-04-07 PROCEDURE — 0 TECHNETIUM SESTAMIBI: Performed by: NURSE PRACTITIONER

## 2023-04-07 PROCEDURE — 93017 CV STRESS TEST TRACING ONLY: CPT

## 2023-04-07 PROCEDURE — 93306 TTE W/DOPPLER COMPLETE: CPT | Performed by: INTERNAL MEDICINE

## 2023-04-07 PROCEDURE — 93018 CV STRESS TEST I&R ONLY: CPT | Performed by: INTERNAL MEDICINE

## 2023-04-07 PROCEDURE — 93306 TTE W/DOPPLER COMPLETE: CPT

## 2023-04-07 PROCEDURE — 25010000002 REGADENOSON 0.4 MG/5ML SOLUTION: Performed by: NURSE PRACTITIONER

## 2023-04-07 PROCEDURE — 25010000002 SULFUR HEXAFLUORIDE MICROSPH 60.7-25 MG RECONSTITUTED SUSPENSION: Performed by: INTERNAL MEDICINE

## 2023-04-07 RX ADMIN — TECHNETIUM TC 99M SESTAMIBI 1 DOSE: 1 INJECTION INTRAVENOUS at 10:08

## 2023-04-07 RX ADMIN — TECHNETIUM TC 99M SESTAMIBI 1 DOSE: 1 INJECTION INTRAVENOUS at 08:41

## 2023-04-07 RX ADMIN — SULFUR HEXAFLUORIDE 2 ML: KIT at 07:34

## 2023-04-07 RX ADMIN — REGADENOSON 0.4 MG: 0.08 INJECTION, SOLUTION INTRAVENOUS at 10:08

## 2023-04-11 LAB
BH CV ECHO MEAS - ACS: 2.15 CM
BH CV ECHO MEAS - AO MAX PG: 7.5 MMHG
BH CV ECHO MEAS - AO MEAN PG: 3.7 MMHG
BH CV ECHO MEAS - AO ROOT DIAM: 2.7 CM
BH CV ECHO MEAS - AO V2 MAX: 136.6 CM/SEC
BH CV ECHO MEAS - AO V2 VTI: 30.4 CM
BH CV ECHO MEAS - AVA(I,D): 1.66 CM2
BH CV ECHO MEAS - EDV(CUBED): 89.5 ML
BH CV ECHO MEAS - EDV(MOD-SP4): 119.5 ML
BH CV ECHO MEAS - EF(MOD-BP): 54 %
BH CV ECHO MEAS - EF(MOD-SP4): 54 %
BH CV ECHO MEAS - ESV(CUBED): 24.6 ML
BH CV ECHO MEAS - ESV(MOD-SP4): 55 ML
BH CV ECHO MEAS - FS: 35 %
BH CV ECHO MEAS - IVS/LVPW: 0.84 CM
BH CV ECHO MEAS - IVSD: 0.71 CM
BH CV ECHO MEAS - LA DIMENSION: 3 CM
BH CV ECHO MEAS - LV DIASTOLIC VOL/BSA (35-75): 56.4 CM2
BH CV ECHO MEAS - LV MASS(C)D: 108 GRAMS
BH CV ECHO MEAS - LV MAX PG: 2.48 MMHG
BH CV ECHO MEAS - LV MEAN PG: 1.52 MMHG
BH CV ECHO MEAS - LV SYSTOLIC VOL/BSA (12-30): 26 CM2
BH CV ECHO MEAS - LV V1 MAX: 78.7 CM/SEC
BH CV ECHO MEAS - LV V1 VTI: 20.1 CM
BH CV ECHO MEAS - LVIDD: 4.5 CM
BH CV ECHO MEAS - LVIDS: 2.9 CM
BH CV ECHO MEAS - LVOT AREA: 2.5 CM2
BH CV ECHO MEAS - LVOT DIAM: 1.79 CM
BH CV ECHO MEAS - LVPWD: 0.84 CM
BH CV ECHO MEAS - MV A MAX VEL: 50.2 CM/SEC
BH CV ECHO MEAS - MV DEC SLOPE: 691 CM/SEC2
BH CV ECHO MEAS - MV DEC TIME: 0.19 MSEC
BH CV ECHO MEAS - MV E MAX VEL: 132.1 CM/SEC
BH CV ECHO MEAS - MV E/A: 2.6
BH CV ECHO MEAS - MV MAX PG: 7.3 MMHG
BH CV ECHO MEAS - MV MEAN PG: 2.5 MMHG
BH CV ECHO MEAS - MV V2 VTI: 35.4 CM
BH CV ECHO MEAS - MVA(VTI): 1.43 CM2
BH CV ECHO MEAS - PA ACC TIME: 0.22 SEC
BH CV ECHO MEAS - PA PR(ACCEL): -20.7 MMHG
BH CV ECHO MEAS - PI END-D VEL: 95.1 CM/SEC
BH CV ECHO MEAS - PULM A REVS DUR: 0.1 SEC
BH CV ECHO MEAS - PULM A REVS VEL: 28.1 CM/SEC
BH CV ECHO MEAS - PULM DIAS VEL: 77.1 CM/SEC
BH CV ECHO MEAS - PULM S/D: 0.67
BH CV ECHO MEAS - PULM SYS VEL: 51.7 CM/SEC
BH CV ECHO MEAS - RAP SYSTOLE: 3 MMHG
BH CV ECHO MEAS - RV MAX PG: 1.25 MMHG
BH CV ECHO MEAS - RV V1 MAX: 56 CM/SEC
BH CV ECHO MEAS - RV V1 VTI: 16.2 CM
BH CV ECHO MEAS - RVDD: 2.8 CM
BH CV ECHO MEAS - RVSP: 28.1 MMHG
BH CV ECHO MEAS - SI(MOD-SP4): 30.4 ML/M2
BH CV ECHO MEAS - SV(LVOT): 50.5 ML
BH CV ECHO MEAS - SV(MOD-SP4): 64.5 ML
BH CV ECHO MEAS - TR MAX PG: 25.1 MMHG
BH CV ECHO MEAS - TR MAX VEL: 250.5 CM/SEC
MAXIMAL PREDICTED HEART RATE: 186 BPM
STRESS TARGET HR: 158 BPM

## 2023-04-16 ENCOUNTER — TELEMEDICINE (OUTPATIENT)
Dept: FAMILY MEDICINE CLINIC | Facility: TELEHEALTH | Age: 33
End: 2023-04-16
Payer: COMMERCIAL

## 2023-04-16 DIAGNOSIS — J06.9 VIRAL UPPER RESPIRATORY TRACT INFECTION: Primary | ICD-10-CM

## 2023-04-16 PROCEDURE — 99213 OFFICE O/P EST LOW 20 MIN: CPT | Performed by: NURSE PRACTITIONER

## 2023-04-16 RX ORDER — MELOXICAM 15 MG/1
7.5 TABLET ORAL DAILY PRN
COMMUNITY
Start: 2023-04-12 | End: 2023-05-01

## 2023-04-16 RX ORDER — DEXTROMETHORPHAN HYDROBROMIDE AND PROMETHAZINE HYDROCHLORIDE 15; 6.25 MG/5ML; MG/5ML
5 SYRUP ORAL 4 TIMES DAILY PRN
Qty: 120 ML | Refills: 0 | Status: SHIPPED | OUTPATIENT
Start: 2023-04-16

## 2023-04-16 RX ORDER — LIFITEGRAST 50 MG/ML
1 SOLUTION/ DROPS OPHTHALMIC 2 TIMES DAILY
COMMUNITY
Start: 2023-03-16

## 2023-04-16 NOTE — PROGRESS NOTES
"CHIEF COMPLAINT  Chief Complaint   Patient presents with   • Cough   • Nasal Congestion         HPI  Sunita Matamoros is a 33 y.o. female  presents with complaint of cough     Review of Systems   Constitutional: Positive for diaphoresis and fatigue. Negative for chills and fever.   HENT: Positive for congestion, postnasal drip, rhinorrhea, sinus pressure, sneezing and sore throat. Negative for sinus pain.    Respiratory: Positive for cough and wheezing (\"sometimes\" ). Negative for chest tightness and shortness of breath.    Cardiovascular: Negative for chest pain.   Gastrointestinal: Positive for vomiting (x 2 from coughing. ). Negative for diarrhea and nausea.   Musculoskeletal: Negative for myalgias.   Neurological: Negative for headaches.       Past Medical History:   Diagnosis Date   • Abnormal ECG 03/11/2023   • Asthma    • GERD (gastroesophageal reflux disease)    • Heart murmur     Closed up had when i was a kid   • Hyperlipidemia     On meds for   • Hypertension    • Leaky heart valve    • Migraines    • PCOS (polycystic ovarian syndrome)    • Rheumatoid arteritis    • Tachycardia    • Type 2 diabetes mellitus without complication 2/26/2019       Family History   Problem Relation Age of Onset   • Heart attack Mother    • Heart disease Maternal Grandfather    • Heart failure Maternal Grandfather    • Heart failure Maternal Grandmother        Social History     Socioeconomic History   • Marital status: Significant Other   • Number of children: 2   Tobacco Use   • Smoking status: Never     Passive exposure: Never   • Smokeless tobacco: Never   Vaping Use   • Vaping Use: Never used   Substance and Sexual Activity   • Alcohol use: Yes     Comment: Occasionally   • Drug use: No   • Sexual activity: Not Currently     Partners: Male     Birth control/protection: Birth control pill       Sunita Matamoros  reports that she has never smoked. She has never been exposed to tobacco smoke. She has never used smokeless " tobacco.       LMP 04/09/2023 (Approximate)   Breastfeeding No     PHYSICAL EXAM  Physical Exam   Constitutional: She is oriented to person, place, and time. She appears well-developed and well-nourished. She does not have a sickly appearance. She does not appear ill. No distress.   HENT:   Head: Normocephalic and atraumatic.   Mouth/Throat: Mouth/Lips are normal.Uvula is midline and oropharynx is clear and moist. Mucous membranes are not pale, not dry, not cyanotic and erythematous (mild ). No tonsillar exudate. no white patchesnot blistered  Eyes: EOM are normal.   Neck: Neck normal appearance.  Pulmonary/Chest: Effort normal.  No respiratory distress.  Neurological: She is alert and oriented to person, place, and time.   Skin: Skin is dry.   Psychiatric: She has a normal mood and affect.           Diagnoses and all orders for this visit:    1. Viral upper respiratory tract infection (Primary)    Other orders  -     promethazine-dextromethorphan (PROMETHAZINE-DM) 6.25-15 MG/5ML syrup; Take 5 mL by mouth 4 (Four) Times a Day As Needed for Cough.  Dispense: 120 mL; Refill: 0    Recommend COVID-19 testing.   Discussed this is likely viral and antibiotics are not effective treatment for viral illnesses.     The use of a video visit has been reviewed with the patient and verbal informed consent has been obtained. Myself and Sunita Matamoros participated in this visit. The patient is located in [address] [facility] [place] [address]. I am located in [place], [place]. [employer] and [employer] were utilized.       Note Disclaimer: At [facility], we believe that sharing information builds trust and better   relationships. You are receiving this note because you recently visited [facility]. It is possible you   will see health information before a provider has talked with you about it. This kind of information can   be easy to misunderstand. To help you fully understand what it means for your health, we urge you to    discuss this note with your provider.    Althea Persaud, APRN  04/16/2023  11:06 EDT

## 2023-04-16 NOTE — PATIENT INSTRUCTIONS
Drink plenty of water  Over the counter pain relievers okay   If symptoms do not improve in 3-4 days follow up with your primary care provider or urgent care  If symptoms worsen follow up with urgent care or the emergency room    Recommend testing for COVID-19.   Discussed this is likely viral and antibiotics are not effective treatment for viral illnesses.     Upper Respiratory Infection, Adult  An upper respiratory infection (URI) is a common viral infection of the nose, throat, and upper air passages that lead to the lungs. The most common type of URI is the common cold. URIs usually get better on their own, without medical treatment.  What are the causes?  A URI is caused by a virus. You may catch a virus by:  Breathing in droplets from an infected person's cough or sneeze.  Touching something that has been exposed to the virus (is contaminated) and then touching your mouth, nose, or eyes.  What increases the risk?  You are more likely to get a URI if:  You are very young or very old.  You have close contact with others, such as at work, school, or a health care facility.  You smoke.  You have long-term (chronic) heart or lung disease.  You have a weakened disease-fighting system (immune system).  You have nasal allergies or asthma.  You are experiencing a lot of stress.  You have poor nutrition.  What are the signs or symptoms?  A URI usually involves some of the following symptoms:  Runny or stuffy (congested) nose.  Cough.  Sneezing.  Sore throat.  Headache.  Fatigue.  Fever.  Loss of appetite.  Pain in your forehead, behind your eyes, and over your cheekbones (sinus pain).  Muscle aches.  Redness or irritation of the eyes.  Pressure in the ears or face.  How is this diagnosed?  This condition may be diagnosed based on your medical history and symptoms, and a physical exam. Your health care provider may use a swab to take a mucus sample from your nose (nasal swab). This sample can be tested to determine what  virus is causing the illness.  How is this treated?  URIs usually get better on their own within 7-10 days. Medicines cannot cure URIs, but your health care provider may recommend certain medicines to help relieve symptoms, such as:  Over-the-counter cold medicines.  Cough suppressants. Coughing is a type of defense against infection that helps to clear the respiratory system, so take these medicines only as recommended by your health care provider.  Fever-reducing medicines.  Follow these instructions at home:  Activity  Rest as needed.  If you have a fever, stay home from work or school until your fever is gone or until your health care provider says your URI cannot spread to other people (is no longer contagious). Your health care provider may have you wear a face mask to prevent your infection from spreading.  Relieving symptoms  Gargle with a mixture of salt and water 3-4 times a day or as needed. To make salt water, completely dissolve ½-1 tsp (3-6 g) of salt in 1 cup (237 mL) of warm water.  Use a cool-mist humidifier to add moisture to the air. This can help you breathe more easily.  Eating and drinking    Drink enough fluid to keep your urine pale yellow.  Eat soups and other clear broths.  General instructions    Take over-the-counter and prescription medicines only as told by your health care provider. These include cold medicines, fever reducers, and cough suppressants.  Do not use any products that contain nicotine or tobacco. These products include cigarettes, chewing tobacco, and vaping devices, such as e-cigarettes. If you need help quitting, ask your health care provider.  Stay away from secondhand smoke.  Stay up to date on all immunizations, including the yearly (annual) flu vaccine.  Keep all follow-up visits. This is important.  How to prevent the spread of infection to others  URIs can be contagious. To prevent the infection from spreading:  Wash your hands with soap and water for at least 20  seconds. If soap and water are not available, use hand .  Avoid touching your mouth, face, eyes, or nose.  Cough or sneeze into a tissue or your sleeve or elbow instead of into your hand or into the air.    Contact a health care provider if:  You are getting worse instead of better.  You have a fever or chills.  Your mucus is brown or red.  You have yellow or brown discharge coming from your nose.  You have pain in your face, especially when you bend forward.  You have swollen neck glands.  You have pain while swallowing.  You have white areas in the back of your throat.  Get help right away if:  You have shortness of breath that gets worse.  You have severe or persistent:  Headache.  Ear pain.  Sinus pain.  Chest pain.  You have chronic lung disease along with any of the following:  Making high-pitched whistling sounds when you breathe, most often when you breathe out (wheezing).  Prolonged cough (more than 14 days).  Coughing up blood.  A change in your usual mucus.  You have a stiff neck.  You have changes in your:  Vision.  Hearing.  Thinking.  Mood.  These symptoms may be an emergency. Get help right away. Call 911.  Do not wait to see if the symptoms will go away.  Do not drive yourself to the hospital.  Summary  An upper respiratory infection (URI) is a common infection of the nose, throat, and upper air passages that lead to the lungs.  A URI is caused by a virus.  URIs usually get better on their own within 7-10 days.  Medicines cannot cure URIs, but your health care provider may recommend certain medicines to help relieve symptoms.  This information is not intended to replace advice given to you by your health care provider. Make sure you discuss any questions you have with your health care provider.  Document Revised: 07/20/2022 Document Reviewed: 07/20/2022  [employer] Patient Education © 2022 [employer]

## 2023-04-21 ENCOUNTER — TELEPHONE (OUTPATIENT)
Dept: CARDIOLOGY | Facility: CLINIC | Age: 33
End: 2023-04-21
Payer: COMMERCIAL

## 2023-05-02 ENCOUNTER — HOSPITAL ENCOUNTER (EMERGENCY)
Facility: HOSPITAL | Age: 33
Discharge: HOME | End: 2023-05-02
Attending: EMERGENCY MEDICINE
Payer: COMMERCIAL

## 2023-05-02 VITALS
SYSTOLIC BLOOD PRESSURE: 166 MMHG | RESPIRATION RATE: 17 BRPM | DIASTOLIC BLOOD PRESSURE: 95 MMHG | HEIGHT: 62 IN | OXYGEN SATURATION: 97 % | TEMPERATURE: 97.4 F | BODY MASS INDEX: 47.11 KG/M2 | HEART RATE: 94 BPM | WEIGHT: 256 LBS

## 2023-05-02 DIAGNOSIS — M26.629 TMJ SYNDROME: Primary | ICD-10-CM

## 2023-05-02 DIAGNOSIS — M26.622 TMJ TENDERNESS, LEFT: ICD-10-CM

## 2023-05-02 PROCEDURE — 99283 EMERGENCY DEPT VISIT LOW MDM: CPT

## 2023-05-02 RX ORDER — CYCLOBENZAPRINE HCL 5 MG
5 TABLET ORAL 3 TIMES DAILY PRN
Qty: 30 TABLET | Refills: 0 | Status: SHIPPED | OUTPATIENT
Start: 2023-05-02

## 2023-05-02 RX ORDER — HYDROCODONE BITARTRATE AND ACETAMINOPHEN 5; 325 MG/1; MG/1
1 TABLET ORAL ONCE
Status: COMPLETED | OUTPATIENT
Start: 2023-05-02 | End: 2023-05-02

## 2023-05-02 RX ADMIN — HYDROCODONE BITARTRATE AND ACETAMINOPHEN 1 TABLET: 5; 325 TABLET ORAL at 03:38

## 2023-05-02 NOTE — FSED PROVIDER NOTE
Subjective   History of Present Illness  Patient is a 33-year-old woman who presents complaining of left TMJ pain for approximately 1 month.  Symptoms have been constant and fluctuating and gradually worsening.  Patient is unsure of cause although she believes she had been eating pork chops at the time of the symptoms began.  She had followed up with her dentist and was advised that she has no dental caries as a cause of her pain.  She is scheduled to see the dentist again this morning to be fitted for a bite block to help reduce her TMJ pain.  Patient does not recall any direct trauma.  No fevers or throat pain.  No gum swelling.  No facial swelling or erythema or warmth.        Review of Systems    Past Medical History:   Diagnosis Date   • Abnormal ECG 03/11/2023   • Asthma    • GERD (gastroesophageal reflux disease)    • Heart murmur     Closed up had when i was a kid   • Hyperlipidemia     On meds for   • Hypertension    • Leaky heart valve    • Migraines    • PCOS (polycystic ovarian syndrome)    • Rheumatoid arteritis    • Tachycardia    • Type 2 diabetes mellitus without complication 2/26/2019       Allergies   Allergen Reactions   • Amoxicillin Hives   • Penicillin G Hives   • Penicillins Hives   • Sucralfate Hives     Other reaction(s): Facial Swelling       Past Surgical History:   Procedure Laterality Date   • HIATAL HERNIA REPAIR  12/2018    with transoral fundoplication   • LAPAROSCOPIC CHOLECYSTECTOMY  12/2018       Family History   Problem Relation Age of Onset   • Heart attack Mother    • Heart disease Maternal Grandfather    • Heart failure Maternal Grandfather    • Heart failure Maternal Grandmother        Social History     Socioeconomic History   • Marital status: Significant Other   • Number of children: 2   Tobacco Use   • Smoking status: Never     Passive exposure: Never   • Smokeless tobacco: Never   Vaping Use   • Vaping Use: Never used   Substance and Sexual Activity   • Alcohol use: Yes      Comment: Occasionally   • Drug use: No   • Sexual activity: Not Currently     Partners: Male     Birth control/protection: Birth control pill           Objective   Physical Exam  Vitals and nursing note reviewed.   Constitutional:       General: She is in acute distress.      Appearance: Normal appearance. She is not ill-appearing or toxic-appearing.   HENT:      Head: Normocephalic and atraumatic.      Comments: Tenderness over the left TMJ and to the left jaw.  No facial swelling or erythema or warmth or evidence of cellulitis.     Left Ear: Tympanic membrane, ear canal and external ear normal.      Nose: Nose normal.      Mouth/Throat:      Mouth: Mucous membranes are moist.      Pharynx: Oropharynx is clear.      Comments: No dental tenderness.  No gum swelling.  Eyes:      Extraocular Movements: Extraocular movements intact.      Pupils: Pupils are equal, round, and reactive to light.   Pulmonary:      Effort: Pulmonary effort is normal.   Musculoskeletal:         General: Normal range of motion.      Cervical back: Normal range of motion and neck supple. No rigidity or tenderness.   Lymphadenopathy:      Cervical: No cervical adenopathy.   Skin:     General: Skin is warm and dry.      Capillary Refill: Capillary refill takes less than 2 seconds.   Neurological:      General: No focal deficit present.      Mental Status: She is alert.      Sensory: No sensory deficit.      Motor: No weakness.         Procedures           ED Course                                           MDM   Patient with 1 month of gradually worsening left-sided jaw pain over the TMJ and along the mandible.  No trauma although the patient believes that her symptoms may have started when she was chewing pork chops.  Patient was seen earlier today and was placed on ibuprofen.  The patient has been taking ibuprofen and also alternating with cold and warm compress and feels she is not getting much pain relief.  Patient is also been seen and  evaluated by her dentist and was ruled out for dental cause of her pain.  She is scheduled to see the dentist again today to be fitted with a bite block to try to reduce her biting during sleep.  Patient is agreeable to trying muscle relaxers and will take a Norco this evening.  This is unlikely parotid gland infection and unlikely dental infection.  Patient has been advised to follow-up with her primary provider she may need a ENT referral if her symptoms persist.    Final diagnoses:   TMJ syndrome   TMJ tenderness, left       ED Disposition  ED Disposition     ED Disposition   Discharge    Condition   Stable    Comment   --             Blaire Olivas MD  [address] [facility] [place] [id number] [phone]    In 1 week      [place] [address] [place] [id number] [phone]    If symptoms worsen         Medication List      New Prescriptions    cyclobenzaprine 5 MG tablet  Commonly known as: FLEXERIL  Take 1 tablet by mouth 3 (Three) Times a Day As Needed for Muscle Spasms.           Where to Get Your Medications      These medications were sent to [facility]/pharmacy #[id number] - [place], [place] - [address] [place] [address] - [phone]  - [phone] FX  [address] [place] [address] [place] [address]    Hours: 24-hours Phone: [phone]   · cyclobenzaprine 5 MG tablet

## 2023-05-02 NOTE — DISCHARGE INSTRUCTIONS
Recommend soft diet.  Continue taking ibuprofen.  Take Flexeril as needed for spasm and discomfort.  Follow-up with your dentist.  Please follow-up with your primary provider to discuss possible referral to ENT if symptoms persist.  Seek immediate medical attention at any time if having any concerns.

## 2023-07-19 ENCOUNTER — HOSPITAL ENCOUNTER (EMERGENCY)
Facility: HOSPITAL | Age: 33
Discharge: HOME | End: 2023-07-19
Attending: EMERGENCY MEDICINE | Admitting: EMERGENCY MEDICINE
Payer: COMMERCIAL

## 2023-07-19 ENCOUNTER — APPOINTMENT (OUTPATIENT)
Dept: CT IMAGING | Facility: HOSPITAL | Age: 33
End: 2023-07-19
Payer: COMMERCIAL

## 2023-07-19 VITALS
RESPIRATION RATE: 16 BRPM | OXYGEN SATURATION: 100 % | WEIGHT: 255 LBS | HEART RATE: 77 BPM | SYSTOLIC BLOOD PRESSURE: 130 MMHG | DIASTOLIC BLOOD PRESSURE: 75 MMHG | TEMPERATURE: 98.2 F | BODY MASS INDEX: 46.93 KG/M2 | HEIGHT: 62 IN

## 2023-07-19 DIAGNOSIS — R10.9 ABDOMINAL PAIN, UNSPECIFIED ABDOMINAL LOCATION: Primary | ICD-10-CM

## 2023-07-19 LAB
ALBUMIN SERPL-MCNC: 3.8 G/DL (ref 3.5–5.2)
ALBUMIN/GLOB SERPL: 1.5 G/DL
ALP SERPL-CCNC: 103 U/L (ref 39–117)
ALT SERPL W P-5'-P-CCNC: 17 U/L (ref 1–33)
ANION GAP SERPL CALCULATED.3IONS-SCNC: 8.8 MMOL/L (ref 5–15)
AST SERPL-CCNC: 16 U/L (ref 1–32)
B-HCG UR QL: NEGATIVE
BASOPHILS # BLD AUTO: 0.02 10*3/MM3 (ref 0–0.2)
BASOPHILS NFR BLD AUTO: 0.3 % (ref 0–1.5)
BILIRUB SERPL-MCNC: <0.2 MG/DL (ref 0–1.2)
BILIRUB UR QL STRIP: NEGATIVE
BUN SERPL-MCNC: 18 MG/DL (ref 6–20)
BUN/CREAT SERPL: 21.2 (ref 7–25)
CALCIUM SPEC-SCNC: 10.5 MG/DL (ref 8.6–10.5)
CHLORIDE SERPL-SCNC: 107 MMOL/L (ref 98–107)
CLARITY UR: CLEAR
CO2 SERPL-SCNC: 21.2 MMOL/L (ref 22–29)
COLOR UR: YELLOW
CREAT SERPL-MCNC: 0.85 MG/DL (ref 0.57–1)
DEPRECATED RDW RBC AUTO: 41.9 FL (ref 37–54)
EGFRCR SERPLBLD CKD-EPI 2021: 92.3 ML/MIN/1.73
EOSINOPHIL # BLD AUTO: 0.07 10*3/MM3 (ref 0–0.4)
EOSINOPHIL NFR BLD AUTO: 1.1 % (ref 0.3–6.2)
ERYTHROCYTE [DISTWIDTH] IN BLOOD BY AUTOMATED COUNT: 12.4 % (ref 12.3–15.4)
GLOBULIN UR ELPH-MCNC: 2.6 GM/DL
GLUCOSE SERPL-MCNC: 92 MG/DL (ref 65–99)
GLUCOSE UR STRIP-MCNC: NEGATIVE MG/DL
HCT VFR BLD AUTO: 39 % (ref 34–46.6)
HGB BLD-MCNC: 12.6 G/DL (ref 12–15.9)
HGB UR QL STRIP.AUTO: NEGATIVE
IMM GRANULOCYTES # BLD AUTO: 0 10*3/MM3 (ref 0–0.05)
IMM GRANULOCYTES NFR BLD AUTO: 0 % (ref 0–0.5)
KETONES UR QL STRIP: NEGATIVE
LEUKOCYTE ESTERASE UR QL STRIP.AUTO: NEGATIVE
LIPASE SERPL-CCNC: 36 U/L (ref 13–60)
LYMPHOCYTES # BLD AUTO: 1.42 10*3/MM3 (ref 0.7–3.1)
LYMPHOCYTES NFR BLD AUTO: 22.5 % (ref 19.6–45.3)
MCH RBC QN AUTO: 29.6 PG (ref 26.6–33)
MCHC RBC AUTO-ENTMCNC: 32.3 G/DL (ref 31.5–35.7)
MCV RBC AUTO: 91.8 FL (ref 79–97)
MONOCYTES # BLD AUTO: 0.5 10*3/MM3 (ref 0.1–0.9)
MONOCYTES NFR BLD AUTO: 7.9 % (ref 5–12)
NEUTROPHILS NFR BLD AUTO: 4.3 10*3/MM3 (ref 1.7–7)
NEUTROPHILS NFR BLD AUTO: 68.2 % (ref 42.7–76)
NITRITE UR QL STRIP: NEGATIVE
PH UR STRIP.AUTO: 5.5 [PH] (ref 5–8)
PLATELET # BLD AUTO: 311 10*3/MM3 (ref 140–450)
PMV BLD AUTO: 10.8 FL (ref 6–12)
POTASSIUM SERPL-SCNC: 3.9 MMOL/L (ref 3.5–5.2)
PROT SERPL-MCNC: 6.4 G/DL (ref 6–8.5)
PROT UR QL STRIP: NEGATIVE
RBC # BLD AUTO: 4.25 10*6/MM3 (ref 3.77–5.28)
SODIUM SERPL-SCNC: 137 MMOL/L (ref 136–145)
SP GR UR STRIP: >=1.03 (ref 1–1.03)
UROBILINOGEN UR QL STRIP: NORMAL
WBC NRBC COR # BLD: 6.31 10*3/MM3 (ref 3.4–10.8)

## 2023-07-19 PROCEDURE — 81003 URINALYSIS AUTO W/O SCOPE: CPT | Performed by: NURSE PRACTITIONER

## 2023-07-19 PROCEDURE — 25510000001 IOPAMIDOL PER 1 ML: Performed by: EMERGENCY MEDICINE

## 2023-07-19 PROCEDURE — 74177 CT ABD & PELVIS W/CONTRAST: CPT

## 2023-07-19 PROCEDURE — 80053 COMPREHEN METABOLIC PANEL: CPT | Performed by: NURSE PRACTITIONER

## 2023-07-19 PROCEDURE — 81025 URINE PREGNANCY TEST: CPT | Performed by: NURSE PRACTITIONER

## 2023-07-19 PROCEDURE — 83690 ASSAY OF LIPASE: CPT | Performed by: NURSE PRACTITIONER

## 2023-07-19 PROCEDURE — 85025 COMPLETE CBC W/AUTO DIFF WBC: CPT | Performed by: NURSE PRACTITIONER

## 2023-07-19 PROCEDURE — 99283 EMERGENCY DEPT VISIT LOW MDM: CPT

## 2023-07-19 RX ORDER — SODIUM CHLORIDE 0.9 % (FLUSH) 0.9 %
10 SYRINGE (ML) INJECTION AS NEEDED
Status: DISCONTINUED | OUTPATIENT
Start: 2023-07-19 | End: 2023-07-19 | Stop reason: HOSPADM

## 2023-07-19 RX ADMIN — IOPAMIDOL 100 ML: 755 INJECTION, SOLUTION INTRAVENOUS at 19:36

## 2023-07-19 NOTE — FSED PROVIDER NOTE
Subjective   History of Present Illness  The patient is a 33-year-old female who presents to the ER with right-sided abdominal pain and blood in her belly button for the past 3 days. Patient reports she has had some chills and sweats, some nausea. Patient denies any fevers, vomiting or diarrhea.     History provided by:  Patient   used: No      Review of Systems   Gastrointestinal:  Positive for abdominal pain and nausea.     Past Medical History:   Diagnosis Date    Abnormal ECG 03/11/2023    Asthma     GERD (gastroesophageal reflux disease)     Heart murmur     Closed up had when i was a kid    Hyperlipidemia     On meds for    Hypertension     Leaky heart valve     Migraines     PCOS (polycystic ovarian syndrome)     Rheumatoid arteritis     Tachycardia     Type 2 diabetes mellitus without complication 2/26/2019       Allergies   Allergen Reactions    Amoxicillin Hives    Penicillin G Hives    Penicillins Hives    Sucralfate Hives     Other reaction(s): Facial Swelling       Past Surgical History:   Procedure Laterality Date    HIATAL HERNIA REPAIR  12/2018    with transoral fundoplication    LAPAROSCOPIC CHOLECYSTECTOMY  12/2018       Family History   Problem Relation Age of Onset    Heart attack Mother     Heart disease Maternal Grandfather     Heart failure Maternal Grandfather     Heart failure Maternal Grandmother        Social History     Socioeconomic History    Marital status: Significant Other    Number of children: 2   Tobacco Use    Smoking status: Never     Passive exposure: Never    Smokeless tobacco: Never   Vaping Use    Vaping Use: Never used   Substance and Sexual Activity    Alcohol use: Yes     Comment: Occasionally    Drug use: No    Sexual activity: Not Currently     Partners: Male     Birth control/protection: Birth control pill           Objective   Physical Exam  Vitals and nursing note reviewed.   Constitutional:       Appearance: Normal appearance. She is obese.    HENT:      Head: Normocephalic.      Right Ear: Tympanic membrane normal.      Left Ear: Tympanic membrane normal.      Nose: Nose normal.      Mouth/Throat:      Mouth: Mucous membranes are moist.   Eyes:      Extraocular Movements: Extraocular movements intact.      Conjunctiva/sclera: Conjunctivae normal.      Pupils: Pupils are equal, round, and reactive to light.   Cardiovascular:      Pulses: Normal pulses.      Heart sounds: Normal heart sounds.   Pulmonary:      Effort: Pulmonary effort is normal.      Breath sounds: Normal breath sounds and air entry.   Abdominal:      Palpations: Abdomen is soft.       Musculoskeletal:         General: Normal range of motion.      Cervical back: Full passive range of motion without pain, normal range of motion and neck supple.      Right lower leg: No edema.      Left lower leg: No edema.   Skin:     General: Skin is warm and dry.      Comments: Patient reports that she had some blood coming from her belly button earlier, but on exam, no blood is visible.    Neurological:      General: No focal deficit present.      Mental Status: She is alert and oriented to person, place, and time.   Psychiatric:         Behavior: Behavior normal. Behavior is cooperative.       Procedures           ED Course                                           Medical Decision Making  The patient is a 33-year-old female who presents to the ER with right-sided abdominal pain and blood in her belly button for the past 3 days. Patient reports she has had some chills and sweats, some nausea. Patient denies any fevers, vomiting or diarrhea. Patient reports tenderness to the right lower quadrant on the palpation. No blood noted in umbilicus on exam. Patient in no acute distress on exam,       Amount and/or Complexity of Data Reviewed  Labs: ordered.  Radiology: ordered.    Risk  Prescription drug management.        Final diagnoses:   Abdominal pain, unspecified abdominal location       ED  Disposition  ED Disposition       ED Disposition   Discharge    Condition   Stable    Comment   --               Blaire Olivas MD  [address] [facility] [place] [address] [phone]    Schedule an appointment as soon as possible for a visit in 1 week           Medication List      No changes were made to your prescriptions during this visit.

## 2023-07-19 NOTE — DISCHARGE INSTRUCTIONS
Return for any new or worsening symptoms.    Follow-up with primary care for further evaluation and treatment    Tylenol/Motrin as needed for pain/fever    You should stay on clear liquid diet for the next 24 hours and then advance as tolerated if you are still nauseated.

## 2023-11-06 DIAGNOSIS — E66.01 MORBID OBESITY WITH BMI OF 40.0-44.9, ADULT: ICD-10-CM

## 2023-11-06 DIAGNOSIS — I10 ESSENTIAL HYPERTENSION: ICD-10-CM

## 2023-11-06 DIAGNOSIS — E78.5 DYSLIPIDEMIA: ICD-10-CM

## 2023-11-07 ENCOUNTER — LAB (OUTPATIENT)
Dept: LAB | Facility: HOSPITAL | Age: 33
End: 2023-11-07
Payer: COMMERCIAL

## 2023-11-07 LAB
ALBUMIN SERPL-MCNC: 4.1 G/DL (ref 3.5–5.2)
ALBUMIN/GLOB SERPL: 1.6 G/DL
ALP SERPL-CCNC: 94 U/L (ref 39–117)
ALT SERPL W P-5'-P-CCNC: 31 U/L (ref 1–33)
ANION GAP SERPL CALCULATED.3IONS-SCNC: 9 MMOL/L (ref 5–15)
AST SERPL-CCNC: 22 U/L (ref 1–32)
BILIRUB SERPL-MCNC: 0.2 MG/DL (ref 0–1.2)
BUN SERPL-MCNC: 20 MG/DL (ref 6–20)
BUN/CREAT SERPL: 21.3 (ref 7–25)
CALCIUM SPEC-SCNC: 9 MG/DL (ref 8.6–10.5)
CHLORIDE SERPL-SCNC: 109 MMOL/L (ref 98–107)
CHOLEST SERPL-MCNC: 153 MG/DL (ref 0–200)
CO2 SERPL-SCNC: 23 MMOL/L (ref 22–29)
CREAT SERPL-MCNC: 0.94 MG/DL (ref 0.57–1)
EGFRCR SERPLBLD CKD-EPI 2021: 81.8 ML/MIN/1.73
GLOBULIN UR ELPH-MCNC: 2.5 GM/DL
GLUCOSE SERPL-MCNC: 105 MG/DL (ref 65–99)
HDLC SERPL-MCNC: 51 MG/DL (ref 40–60)
LDLC SERPL CALC-MCNC: 78 MG/DL (ref 0–100)
LDLC/HDLC SERPL: 1.46 {RATIO}
POTASSIUM SERPL-SCNC: 4.1 MMOL/L (ref 3.5–5.2)
PROT SERPL-MCNC: 6.6 G/DL (ref 6–8.5)
SODIUM SERPL-SCNC: 141 MMOL/L (ref 136–145)
TRIGL SERPL-MCNC: 137 MG/DL (ref 0–150)
VLDLC SERPL-MCNC: 24 MG/DL (ref 5–40)

## 2023-11-07 PROCEDURE — 36415 COLL VENOUS BLD VENIPUNCTURE: CPT

## 2023-11-07 PROCEDURE — 80061 LIPID PANEL: CPT | Performed by: INTERNAL MEDICINE

## 2023-11-07 PROCEDURE — 80053 COMPREHEN METABOLIC PANEL: CPT | Performed by: INTERNAL MEDICINE

## 2023-11-09 NOTE — PROGRESS NOTES
Subjective:     Encounter Date:11/10/2023      Patient ID: Sunita Matamoros is a 33 y.o. female.    Chief Complaint: one year follow up  hypertension     History of Present Illness     Ms. Sunita Matamoros  has PMH of      #. chest pain, history of normal stress  Cardiolite 09/10/2014  #. tachycardia  #. palpitation, normal tsh  #. hypertension since age 16,  workup including abdominal ultrasound for AAA, pheo labs  normal  #. Obesity  BMI 45  #. anxiety disorder, rheumatoid arthritis  #.  obesity and polycystic ovarian syndrome  #.  history of syncope, CT PE study, CT head negative, abnormal chest x-ray with interstitial infiltrate with normal BNP level of 50  #  Rheumatoid arthritis  #Cholecystectomy, hiatal hernia repair  #Allergies/intolerance to amoxicillin, penicillin, Carafate      Here for one year follow up.  Patient's blood pressure well controlled today 116/80  HR 74    Much less anxious today then in the past.        Echocardiogram 4/11/2023  Lumason contrast was given to better evaluate LV function.    Normal LV size and contractility EF of 65-70%  Normal RV size  Normal atrial size  Pulmonic valve is not well visualized.  Aortic valve is not well visualized in short axis to exclude bicuspid valve.  Mitral valve, tricuspid valve appears structurally normal, trace tricuspid regurgitation seen.  Calculated RV systolic pressure of 30 mmHg..  No pericardial effusion seen.  Proximal aorta appears normal in size.      Myoview 4/7/2023   1.  Lexiscan Cardiolite with normal perfusion, negative for ischemia.  2.  Normal wall motion.  LVEF of 71%.      Lab Review:   3/12/2023: labs including CMP, CBC and high sensitivity troponin all negative     Labs from 7/19/2023 potassium 3.9 otherwise BMP lipase CBC all normal  Labs from 11/7/2023 potassium 4.1 glucose 105 otherwise CMP unremarkable lipids with LDL 78 HDL 51      The following portions of the patient's history were reviewed and updated as appropriate:  "allergies, current medications, past family history, past medical history, past social history, past surgical history and problem list.           Review of Systems   Constitutional: Negative for fever and malaise/fatigue.   Cardiovascular:  Negative for chest pain, dyspnea on exertion, leg swelling and palpitations.   Respiratory:  Negative for cough and shortness of breath.    Musculoskeletal:  Negative for joint pain.   Gastrointestinal:  Negative for abdominal pain, nausea and vomiting.   Neurological:  Negative for dizziness, focal weakness, headaches, light-headedness and numbness.   Psychiatric/Behavioral:  The patient is not nervous/anxious.    All other systems reviewed and are negative.    Past Medical History:   Diagnosis Date    Abnormal ECG 03/11/2023    Asthma     GERD (gastroesophageal reflux disease)     Heart murmur     Closed up had when i was a kid    Hyperlipidemia     On meds for    Hypertension     Leaky heart valve     Migraines     PCOS (polycystic ovarian syndrome)     Rheumatoid arteritis     Tachycardia     Type 2 diabetes mellitus without complication 2/26/2019     Past Surgical History:   Procedure Laterality Date    HIATAL HERNIA REPAIR  12/2018    with transoral fundoplication    LAPAROSCOPIC CHOLECYSTECTOMY  12/2018     /80   Pulse 74   Ht 157.5 cm (62\")   Wt 113 kg (250 lb)   SpO2 98%   BMI 45.73 kg/m²   Family History   Problem Relation Age of Onset    Heart attack Mother     Heart disease Maternal Grandfather     Heart failure Maternal Grandfather     Heart failure Maternal Grandmother        Current Outpatient Medications:     aspirin 81 MG EC tablet, Take 1 tablet by mouth Daily., Disp: , Rfl:     azelastine (OPTIVAR) 0.05 % ophthalmic solution, Administer 1 drop to both eyes 2 (Two) Times a Day., Disp: , Rfl:     bisoprolol-hydrochlorothiazide (ZIAC) 10-6.25 MG per tablet, TAKE ONE TABLET BY MOUTH DAILY (Patient taking differently: Take 1 tablet by mouth Daily.), Disp: " 90 tablet, Rfl: 3    buPROPion XL (WELLBUTRIN XL) 150 MG 24 hr tablet, Take 1 tablet by mouth Every Morning., Disp: , Rfl:     diclofenac (VOLTAREN) 1 % gel gel, Apply 4 g topically to the appropriate area as directed 4 (Four) Times a Day As Needed (joint pain)., Disp: 1 tube, Rfl: 2    fexofenadine (ALLEGRA) 180 MG tablet, Take 1 tablet by mouth Daily., Disp: , Rfl:     hydroxychloroquine (PLAQUENIL) 200 MG tablet, Take 1 tablet by mouth 2 (Two) Times a Day., Disp: 180 tablet, Rfl: 1    lurasidone (LATUDA) 40 MG tablet tablet, 1.5 tablets., Disp: , Rfl:     rosuvastatin (CRESTOR) 10 MG tablet, Take 1 tablet by mouth Daily., Disp: , Rfl:     spironolactone (ALDACTONE) 50 MG tablet, TAKE ONE TABLET BY MOUTH DAILY, Disp: 30 tablet, Rfl: 3    Sprintec 28 0.25-35 MG-MCG per tablet, Take 1 tablet by mouth Daily., Disp: , Rfl:     topiramate (TOPAMAX) 50 MG tablet, TAKE ONE TABLET BY MOUTH EVERY MORNING AND TAKE TWO TABLETS EVERY NIGHT AT BEDTIME, Disp: 90 tablet, Rfl: 1    Vitamin D, Cholecalciferol, (CHOLECALCIFEROL) 10 MCG (400 UNIT) tablet, Take 1 tablet by mouth Daily., Disp: , Rfl:     Xiidra 5 % ophthalmic solution, Administer 1 drop to both eyes 2 (Two) Times a Day., Disp: , Rfl:   Allergies   Allergen Reactions    Amoxicillin Hives    Penicillin G Hives    Penicillins Hives    Sucralfate Hives     Other reaction(s): Facial Swelling     Social History     Socioeconomic History    Marital status: Significant Other    Number of children: 2   Tobacco Use    Smoking status: Never     Passive exposure: Never    Smokeless tobacco: Never   Vaping Use    Vaping Use: Never used   Substance and Sexual Activity    Alcohol use: Yes     Comment: Occasionally    Drug use: No    Sexual activity: Not Currently     Partners: Male     Birth control/protection: Birth control pill                Objective:     Vitals reviewed.   Constitutional:       Appearance: Normal and healthy appearance. Well-developed and not in distress.  Morbidly obese.   Neck:      Vascular: No JVR. JVD normal.   Pulmonary:      Effort: Pulmonary effort is normal.      Breath sounds: Normal breath sounds. No wheezing. No rhonchi. No rales.   Chest:      Chest wall: Not tender to palpatation.   Cardiovascular:      PMI at left midclavicular line. Normal rate. Regular rhythm. Normal S1. Normal S2.       Murmurs: There is no murmur.      No gallop.  No click. No rub.   Pulses:     Intact distal pulses.   Edema:     Peripheral edema absent.   Abdominal:      General: Bowel sounds are normal.      Palpations: Abdomen is soft.      Tenderness: There is no abdominal tenderness.   Musculoskeletal: Normal range of motion.         General: No tenderness. Skin:     General: Skin is warm and dry.   Neurological:      General: No focal deficit present.      Mental Status: Alert and oriented to person, place and time.   Psychiatric:         Mood and Affect: Mood is anxious.         Speech: Speech is rapid and pressured.         Behavior: Behavior is cooperative.       Procedures                  Assessment:     MDM       Diagnosis Plan   1. Primary hypertension        2. Mixed hyperlipidemia                         Plan:   Chart and labs reviewed  Lab work normal- unremarkable       Continue bisoprolol hydrochlorothiazide 10- 6.25 and spironolactone 50mg daily   Lipid panel reviewed and unremarkable- continue crestor       Previous echocardiogram and stress test unremarkable     Continue to monitor blood pressure   If increasing or not well controlled we can adjust medications     Follow up in one year   Advised patient I can send referral to bariatric surgery   However also discussed exercising/walking 30 minutes 3 times a week, even if breaking it into 3- 10 minute sessions per day.   Patient wants to lose weight on her own at this time.     Electronically signed by ALFIE Wolfe, 11/10/23, 1:44 PM EST.              This document is intended for medical expert use only.   Reading of this document by patients and/or patient's family without participating medical staff guidance may result in misinterpretation and unintended morbidity. Any interpretation of such data is the responsibility of the patient and/or family member responsible for the patient in concert with their primary or specialist providers, not to be left for sources of online search as such as [employer], [employer] or similar queries.  Relying on these approaches to knowledge may result in misinterpretation, misguided goals of care and even death should patient or family members try recommendations outside of the realm of professional medical care in a supervised inpatient environment.

## 2023-11-10 ENCOUNTER — OFFICE VISIT (OUTPATIENT)
Dept: CARDIOLOGY | Facility: CLINIC | Age: 33
End: 2023-11-10
Payer: COMMERCIAL

## 2023-11-10 VITALS
DIASTOLIC BLOOD PRESSURE: 80 MMHG | BODY MASS INDEX: 46.01 KG/M2 | HEIGHT: 62 IN | OXYGEN SATURATION: 98 % | WEIGHT: 250 LBS | HEART RATE: 74 BPM | SYSTOLIC BLOOD PRESSURE: 116 MMHG

## 2023-11-10 DIAGNOSIS — I10 PRIMARY HYPERTENSION: Primary | ICD-10-CM

## 2023-11-10 DIAGNOSIS — E78.2 MIXED HYPERLIPIDEMIA: ICD-10-CM

## 2023-11-10 PROCEDURE — 99213 OFFICE O/P EST LOW 20 MIN: CPT | Performed by: NURSE PRACTITIONER

## 2023-11-10 RX ORDER — LURASIDONE HYDROCHLORIDE 40 MG/1
60 TABLET, FILM COATED ORAL
COMMUNITY
Start: 2023-08-09

## 2023-11-10 RX ORDER — ASPIRIN 81 MG/1
81 TABLET ORAL DAILY
COMMUNITY

## 2023-11-10 RX ORDER — ERGOCALCIFEROL (VITAMIN D2) 10 MCG
400 TABLET ORAL DAILY
COMMUNITY

## 2023-11-13 RX ORDER — SPIRONOLACTONE 50 MG/1
50 TABLET, FILM COATED ORAL DAILY
Qty: 90 TABLET | Refills: 3 | Status: SHIPPED | OUTPATIENT
Start: 2023-11-13

## 2023-11-13 NOTE — TELEPHONE ENCOUNTER
Rx Refill Note  Requested Prescriptions     Pending Prescriptions Disp Refills    spironolactone (ALDACTONE) 50 MG tablet [Pharmacy Med Name: SPIRONOLACTONE 50 MG TABLET] 90 tablet 3     Sig: TAKE 1 TABLET BY MOUTH DAILY      Last office visit with prescribing clinician: 11/10/2023   Last telemedicine visit with prescribing clinician: Visit date not found   Next office visit with prescribing clinician: 11/11/2024 - with Dr.Sandella Jana Lopez MA  11/13/23, 09:20 EST

## 2024-01-23 DIAGNOSIS — E66.01 MORBID OBESITY WITH BMI OF 45.0-49.9, ADULT: Primary | ICD-10-CM

## 2024-01-29 RX ORDER — BISOPROLOL FUMARATE AND HYDROCHLOROTHIAZIDE 10; 6.25 MG/1; MG/1
TABLET ORAL
Qty: 90 TABLET | Refills: 2 | Status: SHIPPED | OUTPATIENT
Start: 2024-01-29

## 2024-01-29 NOTE — TELEPHONE ENCOUNTER
Rx Refill Note  Requested Prescriptions     Pending Prescriptions Disp Refills    bisoprolol-hydrochlorothiazide (ZIAC) 10-6.25 MG per tablet [Pharmacy Med Name: BISOPROLOL-HCTZ 10-6.25 MG TAB] 90 tablet 3     Sig: TAKE ONE TABLET BY MOUTH DAILY      Last office visit with prescribing clinician: 11/7/2022   Last telemedicine visit with prescribing clinician: Visit date not found   Next office visit with prescribing clinician: 11/11/2024                         Would you like a call back once the refill request has been completed: [] Yes [] No    If the office needs to give you a call back, can they leave a voicemail: [] Yes [] No    Madeline Smith MA  01/29/24, 09:17 EST

## 2024-03-13 RX ORDER — METHOTREXATE 2.5 MG/1
TABLET ORAL
COMMUNITY
Start: 2024-03-04

## 2024-03-13 RX ORDER — LURASIDONE HYDROCHLORIDE 60 MG/1
TABLET, FILM COATED ORAL
COMMUNITY

## 2024-03-13 RX ORDER — CELECOXIB 100 MG/1
100 CAPSULE ORAL 2 TIMES DAILY PRN
COMMUNITY

## 2024-03-13 RX ORDER — FOLIC ACID 1 MG/1
TABLET ORAL
COMMUNITY
Start: 2024-03-12

## 2024-03-13 RX ORDER — LURASIDONE HYDROCHLORIDE 40 MG/1
60 TABLET, FILM COATED ORAL DAILY
COMMUNITY
End: 2024-03-13

## 2024-03-14 ENCOUNTER — OFFICE VISIT (OUTPATIENT)
Dept: BARIATRICS/WEIGHT MGMT | Facility: CLINIC | Age: 34
End: 2024-03-14
Payer: COMMERCIAL

## 2024-03-14 VITALS
BODY MASS INDEX: 46.33 KG/M2 | SYSTOLIC BLOOD PRESSURE: 116 MMHG | DIASTOLIC BLOOD PRESSURE: 69 MMHG | HEART RATE: 82 BPM | OXYGEN SATURATION: 95 % | HEIGHT: 62 IN | WEIGHT: 251.8 LBS

## 2024-03-14 DIAGNOSIS — E66.01 OBESITY, CLASS III, BMI 40-49.9 (MORBID OBESITY): Primary | ICD-10-CM

## 2024-03-14 DIAGNOSIS — Z79.899 MEDICATION MANAGEMENT: ICD-10-CM

## 2024-03-14 RX ORDER — FEXOFENADINE HCL 60 MG/1
60 TABLET, FILM COATED ORAL DAILY
COMMUNITY

## 2024-03-14 RX ORDER — ONDANSETRON 8 MG/1
8 TABLET, ORALLY DISINTEGRATING ORAL EVERY 8 HOURS PRN
Qty: 30 TABLET | Refills: 5 | Status: SHIPPED | OUTPATIENT
Start: 2024-03-14

## 2024-03-14 NOTE — PROGRESS NOTES
"Nutrition Services    Patient Name: Sunita Matamoros  YOB: 1990  MRN: [id number]  Date of Service: 03/14/24      ICD-10-CM ICD-9-CM   1. Obesity, Class III, BMI 40-49.9 (morbid obesity)  E66.01 278.01        NUTRITION ASSESSMENT - BARIATRIC SURGERY      Reason for Visit [facility] new patient, appt 1     H&P      Past Medical History:   Diagnosis Date    Abnormal ECG 03/11/2023    Anxiety     patient packet    Asthma     Depression     patient packet    GERD (gastroesophageal reflux disease)     Heart murmur     Closed up had when i was a kid    Hyperlipidemia     On meds for    Hypertension     Leaky heart valve     Migraines     PCOS (polycystic ovarian syndrome)     Rheumatoid arteritis     Tachycardia     Type 2 diabetes mellitus without complication 02/26/2019       Past Surgical History:   Procedure Laterality Date    HIATAL HERNIA REPAIR  12/2018    with transoral fundoplication    LAPAROSCOPIC CHOLECYSTECTOMY  12/2018        Previous Goals          Encounter Information        Visit Narrative     Patient states she monitors portions currently.  Patient reports  is on Mounjaro and has been making lifestyle changes. Patient has no current exercise but is willing to add some in. Patient might start tracking food and is going to aim for 60 grams of protein each day.     Diet Recall:   Breakfast: patricia edmundo toaster sandwich or oatmeal or banana  Lunch: 12PM - patient has been cutting back on eating out. Frozen meals usually   Dinner: tuna helper  Snacks: snack cakes   Beverages: 1 soda at lunch (regular), water    Exercise: no current exercise    Supplements: no MV, folic acid    Self Monitoring:          Anthropometrics        Current Height, Weight Height: 158.1 cm (62.25\")  Weight: 114 kg (251 lb 12.8 oz) (03/14/24 1404)            Wt Readings from Last 30 Encounters:   03/14/24 1404 114 kg (251 lb 12.8 oz)   11/10/23 0931 113 kg (250 lb)   07/19/23 1715 116 kg (255 lb)   06/29/23 2141 113 kg " (250 lb)   05/02/23 0316 116 kg (256 lb)   05/01/23 0736 116 kg (255 lb)   03/15/23 1552 116 kg (255 lb)   03/11/23 2338 111 kg (245 lb)   11/23/22 1747 113 kg (250 lb)   11/07/22 1320 117 kg (257 lb)   06/28/22 0809 115 kg (252 lb 12.8 oz)   05/20/22 0809 113 kg (250 lb)   11/04/21 1435 108 kg (239 lb)   11/02/20 1532 103 kg (228 lb)   02/27/20 1426 97.5 kg (215 lb)   09/30/19 1548 89.4 kg (197 lb)   09/03/19 0813 89.8 kg (198 lb)   08/06/19 0840 88.9 kg (196 lb)   02/14/19 0738 87.5 kg (193 lb)   02/05/19 0919 87.8 kg (193 lb 9.6 oz)   01/22/19 0911 89 kg (196 lb 3.2 oz)   01/15/19 1550 89.5 kg (197 lb 6.4 oz)   01/08/19 0931 91.7 kg (202 lb 3.2 oz)   11/15/18 0847 99.8 kg (220 lb 2 oz)   10/24/18 1155 98.4 kg (217 lb)   10/09/18 1421 99.8 kg (220 lb)   08/24/18 0941 99.5 kg (219 lb 4 oz)   08/16/18 0742 99.3 kg (219 lb)   07/02/18 1349 98.9 kg (218 lb)   06/12/18 0930 98.9 kg (218 lb)      BMI kg/m2 Body mass index is 45.69 kg/m².       Nutrition Diagnosis         Nutrition Dx Statement Overweight/obesity RT multifactorial biochemical, behavioral and environmental contributors to disease AEB BMI 45.69 kg/m^2         Nutrition Intervention         Nutrition Intervention Nutrition education related to diet modification and physical activity        Monitor/Evaluation        New Goals Patient to add in 1 day of exercise for 15-30 minutes   Patient to add in protein at all meals - aim for 60 grams per day  Patient to add in protein at snacks       Total time spent with pt 15 minutes of which 15 minutes were spent on education.       Electronically signed by:  Karen Giron RD  03/14/24 14:44 EDT

## 2024-03-14 NOTE — PATIENT INSTRUCTIONS
Goals:  Patient to add in 1 day of exercise for 15-30 minutes   Patient to add in protein at all meals - aim for 60 grams per day  Patient to add in protein at snacks

## 2024-03-14 NOTE — PROGRESS NOTES
[facility]  [address] [place] [address]  [address] [place] [address]  Dept: [phone]  3/14/2024      Sunita Matamoros.  [id number]  [id number]  1990  female      Chief Complaint of weight gain; unable to maintain weight loss    History of Present Illness:   Sunita is a 34 y.o. female who presents today for evaluation, education and consultation regarding medical weight management.      Diet History:See dietician/RN/MA documentation for complete history of weight and diet.        Past weight loss attempts: phentermine, Wellbutrin, Topamax, weight watchers     Dr. Butt- cardiologist due to hx of regurgitation       Past Medical History:   Diagnosis Date    Abnormal ECG 03/11/2023    Anxiety     patient packet    Asthma     Depression     patient packet    GERD (gastroesophageal reflux disease)     Heart murmur     Closed up had when i was a kid    Hyperlipidemia     On meds for    Hypertension     Leaky heart valve     Migraines     PCOS (polycystic ovarian syndrome)     Rheumatoid arteritis     Tachycardia     Type 2 diabetes mellitus without complication 02/26/2019   Metformin for PCOS    Past Surgical History:   Procedure Laterality Date    HIATAL HERNIA REPAIR  12/2018    with transoral fundoplication    LAPAROSCOPIC CHOLECYSTECTOMY  12/2018       Allergies   Allergen Reactions    Amoxicillin Hives    Penicillin G Hives    Penicillins Hives    Sucralfate Hives     Other reaction(s): Facial Swelling         Current Outpatient Medications:     azelastine (OPTIVAR) 0.05 % ophthalmic solution, Administer 1 drop to both eyes 2 (Two) Times a Day., Disp: , Rfl:     bisoprolol-hydrochlorothiazide (ZIAC) 10-6.25 MG per tablet, TAKE ONE TABLET BY MOUTH DAILY, Disp: 90 tablet, Rfl: 2    buPROPion XL (WELLBUTRIN XL) 150 MG 24 hr tablet, Take 1 tablet by mouth Every Morning., Disp: , Rfl:     celecoxib (CeleBREX) 100  MG capsule, Take 1 capsule by mouth 2 (Two) Times a Day As Needed for Mild Pain., Disp: , Rfl:     fexofenadine (Allegra Allergy) 60 MG tablet, Take 1 tablet by mouth Daily., Disp: , Rfl:     folic acid (FOLVITE) 1 MG tablet, , Disp: , Rfl:     hydroxychloroquine (PLAQUENIL) 200 MG tablet, Take 1 tablet by mouth 2 (Two) Times a Day., Disp: 180 tablet, Rfl: 1    lurasidone HCl (LATUDA) 60 MG tablet tablet, , Disp: , Rfl:     methotrexate 2.5 MG tablet, , Disp: , Rfl:     rosuvastatin (CRESTOR) 10 MG tablet, Take 1 tablet by mouth Daily., Disp: , Rfl:     Sprintec 28 0.25-35 MG-MCG per tablet, Take 1 tablet by mouth Daily., Disp: , Rfl:     topiramate (TOPAMAX) 50 MG tablet, TAKE ONE TABLET BY MOUTH EVERY MORNING AND TAKE TWO TABLETS EVERY NIGHT AT BEDTIME, Disp: 90 tablet, Rfl: 1    Xiidra 5 % ophthalmic solution, Administer 1 drop to both eyes 2 (Two) Times a Day., Disp: , Rfl:     diclofenac (VOLTAREN) 1 % gel gel, Apply 4 g topically to the appropriate area as directed 4 (Four) Times a Day As Needed (joint pain). (Patient not taking: Reported on 3/14/2024), Disp: 1 tube, Rfl: 2    Social History     Socioeconomic History    Marital status: Significant Other    Number of children: 2   Tobacco Use    Smoking status: Never     Passive exposure: Never    Smokeless tobacco: Never   Vaping Use    Vaping status: Never Used   Substance and Sexual Activity    Alcohol use: Yes     Comment: Occasionally    Drug use: No    Sexual activity: Not Currently     Partners: Male     Birth control/protection: Birth control pill       Family History   Problem Relation Age of Onset    Heart attack Mother     Heart disease Maternal Grandfather     Heart failure Maternal Grandfather     Heart failure Maternal Grandmother          Review of Systems:  Review of Systems   Constitutional:  Positive for fatigue.   Respiratory: Negative.     Cardiovascular: Negative.         HTN, regurgitation, hx of cardiac murmur as a child, HLD  "  Gastrointestinal:         IBS   Endocrine:        \" Pre diabetes\" , PCOS   Musculoskeletal:  Positive for arthralgias and myalgias.        Arthritis   Neurological:         Migraines   Psychiatric/Behavioral:          Depression and anxiety       Physical Exam:  Vital Signs:  Weight: 114 kg (251 lb 12.8 oz)   Body mass index is 45.69 kg/m².                Physical Exam  Constitutional:       Appearance: Normal appearance. She is obese.   Pulmonary:      Effort: Pulmonary effort is normal.   Abdominal:      General: Abdomen is flat.      Palpations: Abdomen is soft.   Skin:     General: Skin is warm and dry.   Neurological:      General: No focal deficit present.      Mental Status: She is alert and oriented to person, place, and time.   Psychiatric:         Mood and Affect: Mood normal.         Behavior: Behavior normal.         Thought Content: Thought content normal.         Judgment: Judgment normal.            Assessment:         Sunita Matamoros is a 34 y.o. year old female with medically complicated severe obesity. Weight: 114 kg (251 lb 12.8 oz), Body mass index is 45.69 kg/m². and weight related problems.    Pt had labwork completed by her pcp and stable overall. Hgb A1c normal.       The patient was advised to start a high protein, low fat and low carbohydrate diet. The patient was given individualized information  along with general group information and handouts.     The patient was encouraged to start routine exercise including but not limited to 150 minutes per week.     The consultation plan was reviewed with the patient.        I think she is a good candidate for medical weight management. I did speak with the pt about medication options. Pt believes she has medication coverage for weight loss medications. PT denies any hx of medullary thyroid cancer or multiple endocrine neoplasia type 2 or pancreatitis or gastroparesis. I did speak with the pt about different options and pt was able to look up " which medications would be covered. Plan to send in zepound for pt today. Plan to follow up in 1 month for med check. Pt to call office with any nausea. Vomiting or abdominal pain. I did also send in Zofran for pt today to take in case of nausea.     Encounter Diagnosis   Name Primary?    Obesity, Class III, BMI 40-49.9 (morbid obesity) Yes       Plan:    Patient will have evaluations and follow up with bariatric dietician before undergoing a multidisciplinary review of their candidacy.  We also discussed other program requirements.      Total time spent with pt 60 minutes of which 45 minutes were spent on education.     Mandi Seals, APRN  3/14/2024

## 2024-03-22 ENCOUNTER — TELEPHONE (OUTPATIENT)
Dept: BARIATRICS/WEIGHT MGMT | Facility: CLINIC | Age: 34
End: 2024-03-22
Payer: COMMERCIAL

## 2024-03-22 RX ORDER — SEMAGLUTIDE 0.25 MG/.5ML
0.25 INJECTION, SOLUTION SUBCUTANEOUS WEEKLY
Qty: 2 ML | Refills: 0 | Status: SHIPPED | OUTPATIENT
Start: 2024-03-22

## 2024-03-22 NOTE — TELEPHONE ENCOUNTER
Called in to request compound semaglutide at [facility] due to inability to get zebound from pharmacy. Willing to pay out of pocket. Aware it comes in a vial and will need to draw up. Aware of concentration and starting dose. Mandi JUDGE notified.

## 2024-03-22 NOTE — TELEPHONE ENCOUNTER
I sent in the compound wegovy for her. Please make sure she is aware the dose is 0.25 mg weekly x 4 weeks then can increase to 0.5 mg weekly if not having any nausea or vomiting.     Compounded Semaglutide from   [facility]    Vial strength: 1mg/ml  ( 1 mg semaglutide/ 0.25mg b12)/ml  Prescription dosage                                                           Amount to administer ( in ML)  0.25 mg semaglutide  0.25 ml weekly   0.5mg semaglutide 0.5 ml weekly   1 mg semaglutide  1 ml weekly   2 mg semaglutide  2 ml weekly

## 2024-05-02 ENCOUNTER — OFFICE VISIT (OUTPATIENT)
Dept: BARIATRICS/WEIGHT MGMT | Facility: CLINIC | Age: 34
End: 2024-05-02
Payer: COMMERCIAL

## 2024-05-02 VITALS
HEIGHT: 62 IN | HEART RATE: 76 BPM | WEIGHT: 246.8 LBS | DIASTOLIC BLOOD PRESSURE: 72 MMHG | BODY MASS INDEX: 45.42 KG/M2 | SYSTOLIC BLOOD PRESSURE: 103 MMHG | OXYGEN SATURATION: 98 %

## 2024-05-02 DIAGNOSIS — Z79.899 MEDICATION MANAGEMENT: ICD-10-CM

## 2024-05-02 DIAGNOSIS — Z71.3 NUTRITIONAL COUNSELING: ICD-10-CM

## 2024-05-02 DIAGNOSIS — E66.01 OBESITY, CLASS III, BMI 40-49.9 (MORBID OBESITY): Primary | ICD-10-CM

## 2024-05-02 NOTE — PATIENT INSTRUCTIONS
Patient Goals:    Patient will add in a multivitamin daily.   Patient will begin walking at least 2-3 days a week for at least 15-20 minutes.   Patient will try to get at least 60-70 oz of water a day.

## 2024-05-02 NOTE — PROGRESS NOTES
[facility] BAR SURG [facility] BARIATRIC SURGERY  [address] [place] [address]  [address] [place] [address]  Dept: [phone]  5/2/2024      Sunita Matamoros.  [id number]  [id number]  1990  female      Chief Complaint   Patient presents with    Weight Loss     MWM #2       The patient is here for month 2 of medical weight management. She had a loss of 5 lbs. The patient states that she is following the recommendations given by our office and dietician including a high lean protein, low carb and low fat diet. We recommended adequate fruits and vegetable intake along with limited portion sizes. Patient is working on eliminating fast foods, fried foods, sweets and soda. Sunita Matamoros has been increasing her daily water intake. She has been exercising: walking .  Wt Readings from Last 10 Encounters:   05/02/24 112 kg (246 lb 12.8 oz)   03/14/24 114 kg (251 lb 12.8 oz)   11/10/23 113 kg (250 lb)   07/19/23 116 kg (255 lb)   06/29/23 113 kg (250 lb)   05/02/23 116 kg (256 lb)   05/01/23 116 kg (255 lb)   03/15/23 116 kg (255 lb)   03/11/23 111 kg (245 lb)   11/23/22 113 kg (250 lb)       Has been doing compounded semaglutide due to not being able to get zepbound  No nausea/ vomiting or abdominal pain with compounded semaglutide     Review of Systems   Constitutional:  Positive for activity change and appetite change.   Respiratory: Negative.     Cardiovascular: Negative.    Gastrointestinal: Negative.    Musculoskeletal: Negative.      Vitals:    05/02/24 1358   BP: 103/72   Pulse: 76   SpO2: 98%         Body mass index is 44.78 kg/m².    The following portions of the patient's history were reviewed and updated as appropriate: active problem list, medication list, allergies, social history, notes from last encounter  Past Medical History:   Diagnosis Date    Abnormal ECG 03/11/2023    Anxiety     patient packet    Asthma     Depression     patient packet     GERD (gastroesophageal reflux disease)     Heart murmur     Closed up had when i was a kid    Hyperlipidemia     On meds for    Hypertension     Leaky heart valve     Migraines     PCOS (polycystic ovarian syndrome)     Rheumatoid arteritis     Tachycardia     Type 2 diabetes mellitus without complication 02/26/2019     Past Surgical History:   Procedure Laterality Date    HIATAL HERNIA REPAIR  12/2018    with transoral fundoplication    LAPAROSCOPIC CHOLECYSTECTOMY  12/2018        Physical Exam  Constitutional:       Appearance: Normal appearance. She is obese.   Pulmonary:      Effort: Pulmonary effort is normal.   Abdominal:      General: Abdomen is flat.      Palpations: Abdomen is soft.   Skin:     General: Skin is warm and dry.   Neurological:      General: No focal deficit present.      Mental Status: She is alert and oriented to person, place, and time.   Psychiatric:         Mood and Affect: Mood normal.         Behavior: Behavior normal.         Thought Content: Thought content normal.         Judgment: Judgment normal.         Discussion/Plan:  BMI 44.78, Class 3 obesity, medication management: semaglutide compound     Obesity/Morbid Obesity: Currently the patient's weight is decreased. Treatment plan includes prescribed diet, prescribed exercise regimen and behavior modification.     Medication options for weight loss were discussed with the pt. Will attempt to send in the 5 mg dosage of zepbound and if pt is able to get this, she can start after finishing out the 0.25 mg dosage of compounded semaglutide. If unable to get approval with insurance or unable to get the medication itself, then will continue with compounded semaglutide for now.       Pt  denies any hx of multiple endocrine neoplasia type 2 or medullary thyroid cancer for herself or her immediate family with GLP-1's. Pt encouraged to call the office with any nausea/ vomiting or abdominal pain with GLP-1's. Pt also informed constipation can  happen with these medications due to slowing of gut. Pt encouraged to take a stool softener/ laxative if constipation occurs.     I reviewed the appropriate dietary choices with the patient and encouraged the necessary changes. Recommended at least 70 grams of protein per day, around 35 grams of fats and less than 100 grams of carbohydrates. Reviewed calorie intake if patient wanted to calorie count and/or had BMR. Instructed patient to drink half of body weight in ounces per day and exercise a minimum of 150 minutes per week including both cardio and strength training. Discussed the option of keeping a food journal which will help patient become more aware of the nutritional value of foods .     The patient was given written materials from our office for diet plans and medications.   I answered all of the patients questions regarding dietary changes, exercise, and medications.   The patient will follow up in 1 month. The total time spent face to face was 30 minutes with 15 minutes spent counseling.        ALIFE Galan  [facility]ariat[facility]

## 2024-05-02 NOTE — PROGRESS NOTES
Nutrition Services    Patient Name: Sunita Matamoros  YOB: 1990  MRN: [id number]  Date of Service: 05/02/24    No diagnosis found.       NUTRITION ASSESSMENT - BARIATRIC SURGERY      Reason for Visit MWM-2     H&P      Past Medical History:   Diagnosis Date    Abnormal ECG 03/11/2023    Anxiety     patient packet    Asthma     Depression     patient packet    GERD (gastroesophageal reflux disease)     Heart murmur     Closed up had when i was a kid    Hyperlipidemia     On meds for    Hypertension     Leaky heart valve     Migraines     PCOS (polycystic ovarian syndrome)     Rheumatoid arteritis     Tachycardia     Type 2 diabetes mellitus without complication 02/26/2019       Past Surgical History:   Procedure Laterality Date    HIATAL HERNIA REPAIR  12/2018    with transoral fundoplication    LAPAROSCOPIC CHOLECYSTECTOMY  12/2018        Previous Goals   Patient to add in 1 day of exercise for 15-30 minutes - working on  Patient to add in protein at all meals - aim for 60 grams per day - doing well  Patient to add in protein at snacks - doing well       Encounter Information        Visit Narrative     Patient has been on wegovy since the beginning of April and increasing dose this month. Patient reports that she is trying to add in more walking and being active.  Patient has started finding high protein snacks to keep her full.  Patient wants to start drinking more water.     Diet Recall:   Breakfast: egg bites or Binh day (egg instead of bun) or fruit  Lunch: Atkins or Smart Ones meals, cottage cheese bowl homemade, grilled chicken, salad  Dinner: sometimes skip, or altered family meals like spaghetti with spaghetti squash   Snacks: guacamole, low carb chips, protein bars, snickers protein bar sometimes, occasionally quest treats  Beverages: water - close to 60 oz a day, gatorade zero maybe once a day    Exercise: added walking 1-2 x/week with daughter    Supplements: none     Self  "Monitoring:          Anthropometrics        Current Height, Weight Height: 158.1 cm (62.25\")  Weight: 112 kg (246 lb 12.8 oz) (05/02/24 1358)       Trending Weight Hx  5 pounds weight loss since last visit 1 month ago.     Wt Readings from Last 30 Encounters:   05/02/24 1358 112 kg (246 lb 12.8 oz)   03/14/24 1404 114 kg (251 lb 12.8 oz)   11/10/23 0931 113 kg (250 lb)   07/19/23 1715 116 kg (255 lb)   06/29/23 2141 113 kg (250 lb)   05/02/23 0316 116 kg (256 lb)   05/01/23 0736 116 kg (255 lb)   03/15/23 1552 116 kg (255 lb)   03/11/23 2338 111 kg (245 lb)   11/23/22 1747 113 kg (250 lb)   11/07/22 1320 117 kg (257 lb)   06/28/22 0809 115 kg (252 lb 12.8 oz)   05/20/22 0809 113 kg (250 lb)   11/04/21 1435 108 kg (239 lb)   11/02/20 1532 103 kg (228 lb)   02/27/20 1426 97.5 kg (215 lb)   09/30/19 1548 89.4 kg (197 lb)   09/03/19 0813 89.8 kg (198 lb)   08/06/19 0840 88.9 kg (196 lb)   02/14/19 0738 87.5 kg (193 lb)   02/05/19 0919 87.8 kg (193 lb 9.6 oz)   01/22/19 0911 89 kg (196 lb 3.2 oz)   01/15/19 1550 89.5 kg (197 lb 6.4 oz)   01/08/19 0931 91.7 kg (202 lb 3.2 oz)   11/15/18 0847 99.8 kg (220 lb 2 oz)   10/24/18 1155 98.4 kg (217 lb)   10/09/18 1421 99.8 kg (220 lb)   08/24/18 0941 99.5 kg (219 lb 4 oz)   08/16/18 0742 99.3 kg (219 lb)   07/02/18 1349 98.9 kg (218 lb)      BMI kg/m2 Body mass index is 44.78 kg/m².       Nutrition Diagnosis         Nutrition Dx Statement Overweight/obesity RT multifactorial biochemical, behavioral and environmental contributors to disease AEB BMI 44.78 kg/m^2.            Nutrition Intervention         Nutrition Intervention Nutrition education related to diet modification and physical activity          Monitor/Evaluation        New Goals Patient will add in a multivitamin daily.   Patient will begin walking at least 2-3 days a week for at least 15-20 minutes.   Patient will try to get at least 60-70 oz of water a day.        Total time spent with pt 15 minutes of which 15 " minutes were spent on education.       Electronically signed by:  Leslie Tyler RD  05/02/24 14:27 EDT

## 2024-05-10 RX ORDER — SEMAGLUTIDE 0.5 MG/.5ML
0.5 INJECTION, SOLUTION SUBCUTANEOUS WEEKLY
Qty: 2 ML | Refills: 0 | Status: SHIPPED | OUTPATIENT
Start: 2024-05-10

## 2024-05-11 ENCOUNTER — TRANSCRIBE ORDERS (OUTPATIENT)
Dept: LAB | Facility: HOSPITAL | Age: 34
End: 2024-05-11
Payer: COMMERCIAL

## 2024-05-11 ENCOUNTER — HOSPITAL ENCOUNTER (OUTPATIENT)
Dept: CARDIOLOGY | Facility: HOSPITAL | Age: 34
Discharge: HOME | End: 2024-05-11
Payer: COMMERCIAL

## 2024-05-11 DIAGNOSIS — F31.81 BIPOLAR II DISORDER: ICD-10-CM

## 2024-05-11 DIAGNOSIS — F31.81 BIPOLAR II DISORDER: Primary | ICD-10-CM

## 2024-05-11 LAB
QT INTERVAL: 421 MS
QTC INTERVAL: 473 MS

## 2024-05-11 PROCEDURE — 93005 ELECTROCARDIOGRAM TRACING: CPT | Performed by: PSYCHIATRY & NEUROLOGY

## 2024-05-17 ENCOUNTER — PRIOR AUTHORIZATION (OUTPATIENT)
Dept: BARIATRICS/WEIGHT MGMT | Facility: CLINIC | Age: 34
End: 2024-05-17
Payer: COMMERCIAL

## 2024-06-14 ENCOUNTER — TELEMEDICINE (OUTPATIENT)
Dept: BARIATRICS/WEIGHT MGMT | Facility: CLINIC | Age: 34
End: 2024-06-14
Payer: COMMERCIAL

## 2024-06-14 VITALS — HEIGHT: 62 IN | WEIGHT: 237.4 LBS | BODY MASS INDEX: 43.69 KG/M2

## 2024-06-14 DIAGNOSIS — Z79.899 MEDICATION MANAGEMENT: ICD-10-CM

## 2024-06-14 DIAGNOSIS — E66.01 OBESITY, CLASS III, BMI 40-49.9 (MORBID OBESITY): Primary | ICD-10-CM

## 2024-06-14 NOTE — PROGRESS NOTES
[facility] BAR SURG [facility] MEDICAL [facility] BARIATRIC SURGERY  [address] [place] [address]  [address] [place] [address] [place] [address]  Dept: [phone]  6/14/2024      Sunita Matamoros.  [id number]  [id number]  1990  female        You have chosen to receive care through a video visit. Do you consent to use a video visit for your medical care today? Yes    Location of patient: Home in [place]   Location of provider: Office in [place] ( [address] [place] [address], [place] [address])      Current weight: 237.4 pounds      The patient is here for month 2 of medical weight management. She had a loss of 9 lbs. The patient states that she is following the recommendations given by our office and dietician including a high lean protein, low carb and low fat diet. We recommended adequate fruits and vegetable intake along with limited portion sizes. Patient is working on eliminating fast foods, fried foods, sweets and soda. Sunita Matamoros has been increasing her daily water intake. She has been exercising: walking some.  Wt Readings from Last 10 Encounters:   05/02/24 112 kg (246 lb 12.8 oz)   03/14/24 114 kg (251 lb 12.8 oz)   11/10/23 113 kg (250 lb)   07/19/23 116 kg (255 lb)   06/29/23 113 kg (250 lb)   05/02/23 116 kg (256 lb)   05/01/23 116 kg (255 lb)   03/15/23 116 kg (255 lb)   03/11/23 111 kg (245 lb)   11/23/22 113 kg (250 lb)     Patient states they have made positive changes including smaller portion sizes   The patient admits to be struggling with none    Some nausea  2nd day , minimal vomiting     Medication Helping with hunger/ portion control     Breakfast: patricia edmundo egg bites, eggs with cheese, oatmeal, [place] toast stick   Lunch: cottage cheese bowel, lean cuisean   Dinner: pot roast with veggies , some night no meal  Atkins nut roll, grapes , cuties   Drinks: water, soda minimal, 0 sugar powerade   Exercise: walking some     Current dose of zepbound 5 mg weekly,  no nausea/ vomiting/ abd pain, constipation reported     Review of Systems   Constitutional:  Positive for activity change, appetite change and fatigue.   Respiratory: Negative.     Cardiovascular: Negative.    Gastrointestinal:  Positive for nausea.   Musculoskeletal: Negative.      Height 62.25 inches, weight 237 pounds, BMI 43.07    The following portions of the patient's history were reviewed and updated as appropriate: active problem list, medication list, allergies, social history, notes from last encounter  Past Medical History:   Diagnosis Date    Abnormal ECG 03/11/2023    Anxiety     patient packet    Asthma     Depression     patient packet    GERD (gastroesophageal reflux disease)     Heart murmur     Closed up had when i was a kid    Hyperlipidemia     On meds for    Hypertension     Leaky heart valve     Migraines     PCOS (polycystic ovarian syndrome)     Rheumatoid arteritis     Tachycardia     Type 2 diabetes mellitus without complication 02/26/2019     Past Surgical History:   Procedure Laterality Date    HIATAL HERNIA REPAIR  12/2018    with transoral fundoplication    LAPAROSCOPIC CHOLECYSTECTOMY  12/2018        Physical Exam  Constitutional:       Appearance: Normal appearance. She is obese.   Pulmonary:      Comments: Unable to assess due to video visit   Abdominal:      Comments: Unable to assess due to video visit    Neurological:      General: No focal deficit present.      Mental Status: She is alert and oriented to person, place, and time.   Psychiatric:         Mood and Affect: Mood normal.         Behavior: Behavior normal.         Thought Content: Thought content normal.         Judgment: Judgment normal.         Discussion/Plan:  BMI 43.07, Class 3 obesity, medication management: zepbound    Obesity/Morbid Obesity: Currently the patient's weight is decreased. Treatment plan includes prescribed diet, prescribed exercise regimen and behavior modification.     Medication options for  weight loss were discussed with the pt and based upon the patient's history and insurance , zepbound  will be prescribed. She has been on zepbound for 1 month ( prior to this did 1 month of semaglutide). Pt is ready to start the 7.5 mg weekly dosage. Will prescribe this for her today. PT to call the office if having increased nausea/ vomiting/ abd pain/ constipation.         Pt  denies any hx of multiple endocrine neoplasia type 2 or medullary thyroid cancer for herself or her immediate family with GLP-1's. Pt encouraged to call the office with any nausea/ vomiting or abdominal pain with GLP-1's. Pt also informed constipation can happen with these medications due to slowing of gut. Pt encouraged to take a stool softener/ laxative if constipation occurs.     I reviewed the appropriate dietary choices with the patient and encouraged the necessary changes. Recommended at least 70 grams of protein per day, around 35 grams of fats and less than 100 grams of carbohydrates. Reviewed calorie intake if patient wanted to calorie count and/or had BMR. Instructed patient to drink half of body weight in ounces per day and exercise a minimum of 150 minutes per week including both cardio and strength training. Discussed the option of keeping a food journal which will help patient become more aware of the nutritional value of foods .     The patient was given written materials from our office for diet plans and medications.   I answered all of the patients questions regarding dietary changes, exercise, and medications.   The patient will follow up in 1 month. The total time spent face to face was 20 minutes with 15 minutes spent counseling.        ALFIE Galan  [facility]ariat[facility]

## 2024-07-09 RX ORDER — TIRZEPATIDE 7.5 MG/.5ML
INJECTION, SOLUTION SUBCUTANEOUS
Qty: 2 ML | Refills: 0 | Status: SHIPPED | OUTPATIENT
Start: 2024-07-09

## 2024-07-09 NOTE — TELEPHONE ENCOUNTER
Meds is zepbound dose 7.5  [facility]     We have been going up in dosage each month but since I don’t see Mandi until next month didn’t know if she wanted to keep me on 7.5 have no nausea vomiting or diarrhea no side effects doing fine     Call back      Weight 231.4  Not pregnant  Dosage I’m on is 7.5   Been on for 1month  Last took 7/5/2024  Next appt is with the dietitian on 7/19/2024 thanks       You  Sunita Matamoros45 minutes ago (8:06 AM)     MT  Please complete all the questions below. Request will then be sent to Mandi for approval.     Medication requested (name and dose):      Pharmacy where request should be sent:      Additional details provided by patient:      Best call back number:      Does the patient have less than a 3 day supply:  [] Yes  [] No     For weight loss medication refill request please ask the patient the following questions as well:     What is your current weight      Are you experiencing any abdominal pain, nausea, or vomiting      Female patients: Are you currently pregnant or any chance of pregnancy      What dose are you currently taking      How long have you been on your current dose       When did you take the medication last      Next appointment date:              Sunita Matamoros  [facility] (supporting ALFIE Chu)16 hours ago (3:54 PM)       Hello I don’t see the dietitian until July 19 but I took my last dose of 7.5 today is there anyway I can get more called in julia if you want me to stay on 7.5 or move up I did wonderful on the 7.5 thanks

## 2024-07-19 ENCOUNTER — OFFICE VISIT (OUTPATIENT)
Dept: BARIATRICS/WEIGHT MGMT | Facility: CLINIC | Age: 34
End: 2024-07-19
Payer: COMMERCIAL

## 2024-07-19 DIAGNOSIS — E66.01 OBESITY, CLASS III, BMI 40-49.9 (MORBID OBESITY): Primary | ICD-10-CM

## 2024-07-22 VITALS — BODY MASS INDEX: 42.76 KG/M2 | HEIGHT: 62 IN | WEIGHT: 232.4 LBS

## 2024-07-22 PROBLEM — E66.01 OBESITY, CLASS III, BMI 40-49.9 (MORBID OBESITY): Status: ACTIVE | Noted: 2024-07-22

## 2024-07-22 NOTE — PROGRESS NOTES
Nutrition Services    Patient Name: Sunita Matamoros  YOB: 1990  MRN: [id number]  Date of Service: 07/22/24      ICD-10-CM ICD-9-CM   1. Obesity, Class III, BMI 40-49.9 (morbid obesity)  E66.01 278.01        See scanned attachment for nutrition note.       Total time spent with pt 15 minutes of which 15 minutes were spent on education.       Electronically signed by:  Karen Giron RD  07/22/24 10:43 EDT

## 2024-07-26 ENCOUNTER — TELEPHONE (OUTPATIENT)
Dept: BARIATRICS/WEIGHT MGMT | Facility: CLINIC | Age: 34
End: 2024-07-26
Payer: COMMERCIAL

## 2024-08-23 ENCOUNTER — OFFICE VISIT (OUTPATIENT)
Dept: BARIATRICS/WEIGHT MGMT | Facility: CLINIC | Age: 34
End: 2024-08-23
Payer: COMMERCIAL

## 2024-08-23 VITALS
OXYGEN SATURATION: 98 % | DIASTOLIC BLOOD PRESSURE: 75 MMHG | HEART RATE: 87 BPM | WEIGHT: 222.2 LBS | BODY MASS INDEX: 40.89 KG/M2 | SYSTOLIC BLOOD PRESSURE: 105 MMHG | HEIGHT: 62 IN

## 2024-08-23 DIAGNOSIS — Z79.899 MEDICATION MANAGEMENT: ICD-10-CM

## 2024-08-23 DIAGNOSIS — E66.01 OBESITY, CLASS III, BMI 40-49.9 (MORBID OBESITY): Primary | ICD-10-CM

## 2024-08-23 RX ORDER — TIRZEPATIDE 10 MG/.5ML
10 INJECTION, SOLUTION SUBCUTANEOUS WEEKLY
Qty: 2 ML | Refills: 1 | Status: SHIPPED | OUTPATIENT
Start: 2024-08-23

## 2024-08-23 NOTE — PROGRESS NOTES
[facility] BAR SURG [facility] BARIATRIC SURGERY  [address] [place] [address]  [address] [place] [address]  Dept: [phone]  8/23/2024      Sunita Matamoros.  [id number]  [id number]  1990  female      Chief Complaint   Patient presents with    Follow-up     MWM #5   - 29 pounds since starting MWM     The patient is here for month 5 of medical weight management. She had a loss of 10 lbs. The patient states that they are following the recommendations given by our office and dietician including a high lean protein, low carb and low fat diet. We recommended adequate fruits and vegetable intake along with limited portion sizes. Patient is working on eliminating fast foods, fried foods, sweets and soda. Sunita Matamoros has been increasing her daily water intake. She has been exercising: walk.  Wt Readings from Last 10 Encounters:   08/23/24 101 kg (222 lb 3.2 oz)   07/22/24 105 kg (232 lb 6.4 oz)   06/14/24 108 kg (237 lb 6.4 oz)   05/02/24 112 kg (246 lb 12.8 oz)   03/14/24 114 kg (251 lb 12.8 oz)   11/10/23 113 kg (250 lb)   07/19/23 116 kg (255 lb)   06/29/23 113 kg (250 lb)   05/02/23 116 kg (256 lb)   05/01/23 116 kg (255 lb)     Patient states they have made positive changes including smaller portion sizes,   The patient admits to be struggling with eating protein     Current dose zepbound 10 mg weekly - one dose of this so far, no nausea or vomiting or abd pain     Breakfast: sausage egg, sometimes oatmeal   Lunch: lean cuisean meal or cottage cheese  Dinner: varies  Snacks: minimally   Drinks: water, sprite prn   Walking: walking on weekends and at kids soccer practice, walking on weekends     Review of Systems   Constitutional:  Positive for activity change and appetite change.   Respiratory: Negative.     Cardiovascular: Negative.    Gastrointestinal: Negative.    Musculoskeletal: Negative.        Body mass index is 40.32 kg/m².    The following  portions of the patient's history were reviewed and updated as appropriate: active problem list, medication list, allergies, social history, notes from last encounter  Past Medical History:   Diagnosis Date    Abnormal ECG 03/11/2023    Anxiety     patient packet    Asthma     Depression     patient packet    GERD (gastroesophageal reflux disease)     Heart murmur     Closed up had when i was a kid    Hyperlipidemia     On meds for    Hypertension     Leaky heart valve     Migraines     PCOS (polycystic ovarian syndrome)     Rheumatoid arteritis     Tachycardia     Type 2 diabetes mellitus without complication 02/26/2019     Past Surgical History:   Procedure Laterality Date    HIATAL HERNIA REPAIR  12/2018    with transoral fundoplication    LAPAROSCOPIC CHOLECYSTECTOMY  12/2018        Physical Exam  Constitutional:       Appearance: Normal appearance. She is obese.   Pulmonary:      Effort: Pulmonary effort is normal.   Abdominal:      General: Abdomen is flat.   Skin:     General: Skin is warm and dry.   Neurological:      General: No focal deficit present.      Mental Status: She is alert and oriented to person, place, and time.   Psychiatric:         Mood and Affect: Mood normal.         Behavior: Behavior normal.         Thought Content: Thought content normal.         Judgment: Judgment normal.         Discussion/Plan:  BMI 40.32, Class 3 obesity, medication management: zepbound    Obesity/Morbid Obesity: Currently the patient's weight is decreased. Treatment plan includes prescribed diet, prescribed exercise regimen and behavior modification.     Medication options for weight loss were discussed with the pt and based upon the patient's history and insurance , 10 mg zepbound weekly will be prescribed/ continued. Pt has completed 4-5 months of this medication and is on 10 mg dosage. She would like to do 2 months at least on the 10 mg weekly dosage. She is not having any nausea / vomiting/ abd pain or  constipation on the medication.       Pt  denies any hx of multiple endocrine neoplasia type 2 or medullary thyroid cancer for herself or her immediate family with GLP-1's. Pt encouraged to call the office with any nausea/ vomiting or abdominal pain with GLP-1's. Pt also informed constipation can happen with these medications due to slowing of gut. Pt encouraged to take a stool softener/ laxative if constipation occurs.     Will refill zepbound medication for pt today.     I reviewed the appropriate dietary choices with the patient and encouraged the necessary changes. Recommended at least 70 grams of protein per day, around 35 grams of fats and less than 100 grams of carbohydrates. Reviewed calorie intake if patient wanted to calorie count and/or had BMR. Instructed patient to drink half of body weight in ounces per day and exercise a minimum of 150 minutes per week including both cardio and strength training. Discussed the option of keeping a food journal which will help patient become more aware of the nutritional value of foods .     The patient was given written materials from our office for diet plans and medications.   I answered all of the patients questions regarding dietary changes, exercise, and medications.   The patient will follow up in 1 month. The total time spent face to face was 20 minutes with 15 minutes spent counseling.      Mandi Seals APRGAVI  [facility]ariat[facility]

## 2024-09-20 ENCOUNTER — OFFICE VISIT (OUTPATIENT)
Dept: BARIATRICS/WEIGHT MGMT | Facility: CLINIC | Age: 34
End: 2024-09-20
Payer: COMMERCIAL

## 2024-09-20 VITALS — WEIGHT: 217 LBS | HEIGHT: 62 IN | BODY MASS INDEX: 39.93 KG/M2

## 2024-09-20 DIAGNOSIS — E66.9 OBESITY, CLASS II, BMI 35-39.9: Primary | ICD-10-CM

## 2024-10-28 NOTE — TELEPHONE ENCOUNTER
Zepbound  Current dose is 10 want to go to 7.5  [facility]  Call back number   I have one does left     Current weight is 206  No abdominal pain nausea or vomiting  I’m not pregnant  Been on current dose 2 months   Took last dose last Tuesday next appt 11/11/2024       You  Sunita ORTEGA [id number] minutes ago (9:13 AM)     MT  PLEASE COMPLETE ALL QUESTIONS BELOW AND REQUEST WILL BE SENT TO [place] FOR APPROVAL. Thank you        Medication requested (name):      Current medication dose:      Do you want to increase your dose or stay the same:      Pharmacy where request should be sent:      Additional details provided by patient:      Best call back number:      Does the patient have less than a 3 day supply:  [] Yes  [] No     For weight loss medication refill request please ask the patient the following questions as well:     What is your current weight      Are you experiencing any abdominal pain, nausea, or vomiting      Female patients: Are you currently pregnant or any chance of pregnancy      What dose are you currently taking      How long have you been on your current dose       When did you take the medication last      Next appointment date:

## 2024-10-29 RX ORDER — BISOPROLOL FUMARATE AND HYDROCHLOROTHIAZIDE 10; 6.25 MG/1; MG/1
1 TABLET ORAL DAILY
Qty: 90 TABLET | Refills: 0 | Status: SHIPPED | OUTPATIENT
Start: 2024-10-29 | End: 2024-11-01

## 2024-10-29 NOTE — TELEPHONE ENCOUNTER
Rx Refill Note  Requested Prescriptions     Pending Prescriptions Disp Refills    bisoprolol-hydrochlorothiazide (ZIAC) 10-6.25 MG per tablet [Pharmacy Med Name: BISOPROLOL-HCTZ 10-6.25 MG TAB] 90 tablet 0     Sig: TAKE 1 TABLET BY MOUTH DAILY      Last office visit with prescribing clinician: 11/7/2022   Last telemedicine visit with prescribing clinician: Visit date not found   Next office visit with prescribing clinician: 11/11/2024                         Would you like a call back once the refill request has been completed: [] Yes [] No    If the office needs to give you a call back, can they leave a voicemail: [] Yes [] No    Jaqueline Booker MA  10/29/24, 09:14 EDT

## 2024-11-01 RX ORDER — BISOPROLOL FUMARATE AND HYDROCHLOROTHIAZIDE 10; 6.25 MG/1; MG/1
1 TABLET ORAL DAILY
Qty: 90 TABLET | Refills: 0 | Status: SHIPPED | OUTPATIENT
Start: 2024-11-01

## 2024-11-01 NOTE — TELEPHONE ENCOUNTER
Rx Refill Note  Requested Prescriptions     Pending Prescriptions Disp Refills    bisoprolol-hydrochlorothiazide (ZIAC) 10-6.25 MG per tablet [Pharmacy Med Name: BISOPROLOL-HCTZ 10-6.25 MG TAB] 90 tablet 0     Sig: TAKE 1 TABLET BY MOUTH DAILY      Last office visit with prescribing clinician: 11/7/2022   Last telemedicine visit with prescribing clinician: Visit date not found   Next office visit with prescribing clinician: 11/11/2024                         Would you like a call back once the refill request has been completed: [] Yes [] No    If the office needs to give you a call back, can they leave a voicemail: [] Yes [] No    Madeline Smith MA  11/01/24, 08:00 EDT